# Patient Record
Sex: FEMALE | Race: BLACK OR AFRICAN AMERICAN | NOT HISPANIC OR LATINO | URBAN - METROPOLITAN AREA
[De-identification: names, ages, dates, MRNs, and addresses within clinical notes are randomized per-mention and may not be internally consistent; named-entity substitution may affect disease eponyms.]

---

## 2020-06-18 ENCOUNTER — OFFICE VISIT (OUTPATIENT)
Dept: URBAN - METROPOLITAN AREA TELEHEALTH 2 | Facility: TELEHEALTH | Age: 70
End: 2020-06-18
Payer: MEDICARE

## 2020-06-18 DIAGNOSIS — I85.10 SECONDARY ESOPHAGEAL VARICES WITHOUT BLEEDING: ICD-10-CM

## 2020-06-18 DIAGNOSIS — K74.69 OTHER CIRRHOSIS OF LIVER: ICD-10-CM

## 2020-06-18 DIAGNOSIS — K31.84 GASTROPARESIS: ICD-10-CM

## 2020-06-18 DIAGNOSIS — K21.9 GERD (GASTROESOPHAGEAL REFLUX DISEASE): ICD-10-CM

## 2020-06-18 PROCEDURE — 3017F COLORECTAL CA SCREEN DOC REV: CPT | Performed by: NURSE PRACTITIONER

## 2020-06-18 PROCEDURE — 99213 OFFICE O/P EST LOW 20 MIN: CPT | Performed by: NURSE PRACTITIONER

## 2020-06-18 PROCEDURE — G8427 DOCREV CUR MEDS BY ELIG CLIN: HCPCS | Performed by: NURSE PRACTITIONER

## 2020-06-18 RX ORDER — ASPIRIN 81 MG/1
TAKE 1 TABLET (81 MG) BY ORAL ROUTE ONCE DAILY TABLET, COATED ORAL 1
Qty: 0 | Refills: 0 | Status: ACTIVE | COMMUNITY
Start: 1900-01-01

## 2020-06-18 RX ORDER — SPIRONOLACTONE 25 MG/1
TABLET ORAL
Qty: 0 | Refills: 0 | Status: ACTIVE | COMMUNITY
Start: 1900-01-01

## 2020-06-18 RX ORDER — PANTOPRAZOLE SODIUM 40 MG/1
TAKE 1 TABLET BY MOUTH TWICE DAILY TABLET, DELAYED RELEASE ORAL
Qty: 180 | Refills: 3 | Status: ACTIVE | COMMUNITY
Start: 2020-04-28

## 2020-06-18 RX ORDER — METFORMIN HYDROCHLORIDE 500 MG/1
TABLET, COATED ORAL
Qty: 0 | Refills: 0 | Status: ACTIVE | COMMUNITY
Start: 1900-01-01

## 2020-06-18 RX ORDER — NADOLOL 20 MG/1
TAKE 0.5 TABLET BY ORAL ROUTE DAILY TABLET ORAL
Qty: 15 | Refills: 11 | Status: ACTIVE | COMMUNITY
Start: 2019-11-04

## 2020-06-18 RX ORDER — AMLODIPINE BESYLATE 5 MG
TABLET ORAL
Qty: 0 | Refills: 0 | Status: ACTIVE | COMMUNITY
Start: 1900-01-01

## 2020-06-18 RX ORDER — LORATADINE/PSEUDOEPHEDRINE 5 MG-120MG
TABLET, EXTENDED RELEASE 12 HR ORAL
Qty: 0 | Refills: 0 | Status: ACTIVE | COMMUNITY
Start: 1900-01-01

## 2020-06-18 RX ORDER — ALENDRONATE SODIUM 70 MG
TABLET ORAL
Qty: 0 | Refills: 0 | Status: ACTIVE | COMMUNITY
Start: 1900-01-01

## 2020-06-18 RX ORDER — SUCRALFATE 1 G/1
TAKE 1 TABLET BY MOUTH FOUR TIMES DAILY ON EMPTY STOMACH 1 HOUR BEFORE MEALS AND AT BEDTIME TABLET ORAL
Qty: 360 | Refills: 3 | Status: ACTIVE | COMMUNITY
Start: 2020-01-30

## 2020-06-18 NOTE — HPI-TODAY'S VISIT:
6/18/2020 Rocco presents for follow up of cirrhosis secondary to NAFLD with sequela of esophageal varices, low MELD, reflux, gastroparesis, and IBS. She is doing well during the quarentine, she has been more active and continues to work on weight loss.    3/6/2020 Rocco presents for evaluation of RUQ abdominal pain. This began this week, not exacerbated by eating or having a BM. Worse with laying on her right side. She is not taking anything for the pain. She feels like someone is playing a piano in her abdomen. She denies fever or chills. She is eating her low sodium diet, has no swelling or other associated symptoms. MB  12/17/2019 Rocco presents for follow up of Fatty liver cirrhosis with sequela of esophageal varices, low MELD, reflux, gastroparesis and IBS. Since her last visit she did not start the nadolol, her cardiologist increaed her metoprolol and her PCP added spironolactone for HTN and swelling. Earlier this year she is s/p EGD with new onset esophageal varices. She has a history of fatty liver with fairly normal synthetic function. Her Repeat US show significant steatosis and splenomegaly. She has no sequela of HE or Ascites. She is feeling better on protonix 40mg BID. She has had a chronic work up with positive SMAB and elevated ferritin with negative IGG and normal HFE work up. Her bowels are fairly regular on low dose fiber. SHe met with jennifer and working on weight loss and low sodium diet. Her last MELD is 7. MB

## 2020-08-27 ENCOUNTER — OFFICE VISIT (OUTPATIENT)
Dept: URBAN - METROPOLITAN AREA MEDICAL CENTER 1 | Facility: MEDICAL CENTER | Age: 70
End: 2020-08-27
Payer: MEDICARE

## 2020-08-27 DIAGNOSIS — K29.30 CHRONIC SUPERFICIAL GASTRITIS: ICD-10-CM

## 2020-08-27 DIAGNOSIS — K31.89 ACQUIRED DEFORMITY OF DUODENUM: ICD-10-CM

## 2020-08-27 PROCEDURE — 43239 EGD BIOPSY SINGLE/MULTIPLE: CPT | Performed by: INTERNAL MEDICINE

## 2020-11-09 ENCOUNTER — OFFICE VISIT (OUTPATIENT)
Dept: URBAN - NONMETROPOLITAN AREA CLINIC 2 | Facility: CLINIC | Age: 70
End: 2020-11-09
Payer: MEDICARE

## 2020-11-09 ENCOUNTER — LAB OUTSIDE AN ENCOUNTER (OUTPATIENT)
Dept: URBAN - NONMETROPOLITAN AREA CLINIC 2 | Facility: CLINIC | Age: 70
End: 2020-11-09

## 2020-11-09 VITALS
BODY MASS INDEX: 34.4 KG/M2 | DIASTOLIC BLOOD PRESSURE: 64 MMHG | WEIGHT: 227 LBS | SYSTOLIC BLOOD PRESSURE: 104 MMHG | HEIGHT: 68 IN | TEMPERATURE: 96.2 F | HEART RATE: 91 BPM

## 2020-11-09 DIAGNOSIS — K44.9 HIATAL HERNIA: ICD-10-CM

## 2020-11-09 DIAGNOSIS — K21.9 GERD (GASTROESOPHAGEAL REFLUX DISEASE): ICD-10-CM

## 2020-11-09 DIAGNOSIS — K74.60 CIRRHOSIS: ICD-10-CM

## 2020-11-09 DIAGNOSIS — Z86.010 PERSONAL HISTORY OF COLONIC POLYPS: ICD-10-CM

## 2020-11-09 DIAGNOSIS — K58.9 IRRITABLE BOWEL SYNDROME: ICD-10-CM

## 2020-11-09 DIAGNOSIS — K31.84 GASTROPARESIS: ICD-10-CM

## 2020-11-09 DIAGNOSIS — I85.00 ESOPHAGEAL VARICES: ICD-10-CM

## 2020-11-09 DIAGNOSIS — K76.0 FATTY LIVER: ICD-10-CM

## 2020-11-09 PROCEDURE — 99214 OFFICE O/P EST MOD 30 MIN: CPT | Performed by: NURSE PRACTITIONER

## 2020-11-09 PROCEDURE — 3017F COLORECTAL CA SCREEN DOC REV: CPT | Performed by: NURSE PRACTITIONER

## 2020-11-09 PROCEDURE — G8427 DOCREV CUR MEDS BY ELIG CLIN: HCPCS | Performed by: NURSE PRACTITIONER

## 2020-11-09 PROCEDURE — 1036F TOBACCO NON-USER: CPT | Performed by: NURSE PRACTITIONER

## 2020-11-09 RX ORDER — AMLODIPINE BESYLATE 5 MG
TABLET ORAL
Qty: 0 | Refills: 0 | Status: ACTIVE | COMMUNITY
Start: 1900-01-01

## 2020-11-09 RX ORDER — SUCRALFATE 1 G/1
TAKE 1 TABLET BY MOUTH FOUR TIMES DAILY ON EMPTY STOMACH 1 HOUR BEFORE MEALS AND AT BEDTIME TABLET ORAL
Qty: 360 | Refills: 3 | Status: ACTIVE | COMMUNITY
Start: 2020-01-30

## 2020-11-09 RX ORDER — SPIRONOLACTONE 25 MG/1
TABLET ORAL
Qty: 0 | Refills: 0 | Status: ACTIVE | COMMUNITY
Start: 1900-01-01

## 2020-11-09 RX ORDER — NADOLOL 20 MG/1
TAKE 0.5 TABLET BY ORAL ROUTE DAILY TABLET ORAL
Qty: 15 | Refills: 11 | Status: ACTIVE | COMMUNITY
Start: 2019-11-04

## 2020-11-09 RX ORDER — ALENDRONATE SODIUM 70 MG
TABLET ORAL
Qty: 0 | Refills: 0 | Status: ACTIVE | COMMUNITY
Start: 1900-01-01

## 2020-11-09 RX ORDER — LORATADINE/PSEUDOEPHEDRINE 5 MG-120MG
TABLET, EXTENDED RELEASE 12 HR ORAL
Qty: 0 | Refills: 0 | Status: ACTIVE | COMMUNITY
Start: 1900-01-01

## 2020-11-09 RX ORDER — METFORMIN HYDROCHLORIDE 500 MG/1
TABLET, COATED ORAL
Qty: 0 | Refills: 0 | Status: ACTIVE | COMMUNITY
Start: 1900-01-01

## 2020-11-09 RX ORDER — PANTOPRAZOLE SODIUM 40 MG/1
TAKE 1 TABLET BY MOUTH TWICE DAILY TABLET, DELAYED RELEASE ORAL
Qty: 180 | Refills: 3 | Status: ACTIVE | COMMUNITY
Start: 2020-04-28

## 2020-11-09 RX ORDER — ASPIRIN 81 MG/1
TAKE 1 TABLET (81 MG) BY ORAL ROUTE ONCE DAILY TABLET, COATED ORAL 1
Qty: 0 | Refills: 0 | Status: ACTIVE | COMMUNITY
Start: 1900-01-01

## 2020-11-09 NOTE — HPI-TODAY'S VISIT:
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 Since her last visit she status post EGD for [facility] screening. She has no recurrence on her repeat endoscopy in August. She is stable on nadolol 10 mg twice daily. She denies any abdominal distention or swelling, she has gained weight during the pandemic with the quarantine and agrees that this is due to poor nutritional choices. We continue to discuss low-sodium diet which she tries to maintain for her liver and heart health. Her reflux is stable on high-dose PPI twice daily. She agrees with some weight loss we will likely be able to wean this. She is due for repeat lab work and imaging for HCC screening. Her last ultrasound was in April with no lesions. Her meld this spring was 7. Today she has no symptoms of encephalopathy, jaundice, or pruritus. She is doing well otherwise with no new GI complaints. MB

## 2020-11-10 LAB
A/G RATIO: 1.6
AFP, SERUM, TUMOR MARKER: 4.4
ALBUMIN: 4.2
ALKALINE PHOSPHATASE: 96
ALT (SGPT): 20
AST (SGOT): 28
BASO (ABSOLUTE): 0
BASOS: 1
BILIRUBIN, TOTAL: 0.2
BUN/CREATININE RATIO: 17
BUN: 20
CALCIUM: 9.4
CARBON DIOXIDE, TOTAL: 23
CHLORIDE: 102
CREATININE: 1.16
EGFR IF AFRICN AM: 55
EGFR IF NONAFRICN AM: 48
EOS (ABSOLUTE): 0.1
EOS: 2
GLOBULIN, TOTAL: 2.7
GLUCOSE: 131
HEMATOCRIT: 36.9
HEMATOLOGY COMMENTS:: (no result)
HEMOGLOBIN: 11.3
IMMATURE CELLS: (no result)
IMMATURE GRANS (ABS): 0
IMMATURE GRANULOCYTES: 0
INR: 1
LYMPHS (ABSOLUTE): 1.9
LYMPHS: 39
MCH: 25.1
MCHC: 30.6
MCV: 82
MONOCYTES(ABSOLUTE): 0.4
MONOCYTES: 8
NEUTROPHILS (ABSOLUTE): 2.5
NEUTROPHILS: 50
NRBC: (no result)
PLATELETS: 244
POTASSIUM: 5
PROTEIN, TOTAL: 6.9
PROTHROMBIN TIME: 10.6
RBC: 4.5
RDW: 15.2
SODIUM: 141
WBC: 4.9

## 2021-02-24 ENCOUNTER — ERX REFILL RESPONSE (OUTPATIENT)
Dept: URBAN - NONMETROPOLITAN AREA CLINIC 2 | Facility: CLINIC | Age: 71
End: 2021-02-24

## 2021-02-24 ENCOUNTER — TELEPHONE ENCOUNTER (OUTPATIENT)
Dept: URBAN - NONMETROPOLITAN AREA CLINIC 2 | Facility: CLINIC | Age: 71
End: 2021-02-24

## 2021-02-24 RX ORDER — SUCRALFATE 1 G/1
TAKE 1 TABLET BY MOUTH FOUR TIMES DAILY ON EMPTY STOMACH 1 HOUR BEFORE MEALS AND AT BEDTIME TABLET ORAL
Qty: 360 | Refills: 3

## 2021-02-24 RX ORDER — SUCRALFATE 1 G/1
TAKE 1 TABLET BY MOUTH FOUR TIMES DAILY ON EMPTY STOMACH 1 HOUR BEFORE MEALS AND AT BEDTIME TABLET ORAL TWICE A DAY
Qty: 180 TABLET | Refills: 3
End: 2022-02-19

## 2021-03-17 NOTE — PHYSICAL EXAM GASTROINTESTINAL
Abdomen Self Exam, soft, nontender, nondistended , no masses palpable follow up PRN if any concerns

## 2021-05-05 ENCOUNTER — ERX REFILL RESPONSE (OUTPATIENT)
Dept: URBAN - NONMETROPOLITAN AREA CLINIC 2 | Facility: CLINIC | Age: 71
End: 2021-05-05

## 2021-05-05 RX ORDER — PANTOPRAZOLE SODIUM 40 MG/1
TAKE 1 TABLET BY MOUTH TWICE DAILY TABLET, DELAYED RELEASE ORAL
Qty: 180 | Refills: 3

## 2021-05-10 ENCOUNTER — LAB OUTSIDE AN ENCOUNTER (OUTPATIENT)
Dept: URBAN - NONMETROPOLITAN AREA CLINIC 2 | Facility: CLINIC | Age: 71
End: 2021-05-10

## 2021-05-10 ENCOUNTER — OFFICE VISIT (OUTPATIENT)
Dept: URBAN - NONMETROPOLITAN AREA CLINIC 2 | Facility: CLINIC | Age: 71
End: 2021-05-10
Payer: MEDICARE

## 2021-05-10 VITALS
DIASTOLIC BLOOD PRESSURE: 83 MMHG | SYSTOLIC BLOOD PRESSURE: 137 MMHG | HEIGHT: 68 IN | WEIGHT: 217 LBS | BODY MASS INDEX: 32.89 KG/M2 | HEART RATE: 89 BPM | TEMPERATURE: 96.2 F

## 2021-05-10 DIAGNOSIS — I85.00 ESOPHAGEAL VARICES: ICD-10-CM

## 2021-05-10 DIAGNOSIS — K31.84 GASTROPARESIS: ICD-10-CM

## 2021-05-10 DIAGNOSIS — K74.60 CIRRHOSIS: ICD-10-CM

## 2021-05-10 DIAGNOSIS — K44.9 HIATAL HERNIA: ICD-10-CM

## 2021-05-10 DIAGNOSIS — K21.9 GERD (GASTROESOPHAGEAL REFLUX DISEASE): ICD-10-CM

## 2021-05-10 DIAGNOSIS — Z86.010 PERSONAL HISTORY OF COLONIC POLYPS: ICD-10-CM

## 2021-05-10 DIAGNOSIS — K76.0 FATTY LIVER: ICD-10-CM

## 2021-05-10 DIAGNOSIS — K58.9 IRRITABLE BOWEL SYNDROME: ICD-10-CM

## 2021-05-10 PROCEDURE — 99214 OFFICE O/P EST MOD 30 MIN: CPT | Performed by: INTERNAL MEDICINE

## 2021-05-10 RX ORDER — NADOLOL 20 MG/1
TAKE 0.5 TABLET BY ORAL ROUTE DAILY TABLET ORAL
Qty: 15 | Refills: 11 | Status: ACTIVE | COMMUNITY
Start: 2019-11-04

## 2021-05-10 RX ORDER — ALENDRONATE SODIUM 70 MG
TABLET ORAL
Qty: 0 | Refills: 0 | Status: ACTIVE | COMMUNITY
Start: 1900-01-01

## 2021-05-10 RX ORDER — ASPIRIN 81 MG/1
TAKE 1 TABLET (81 MG) BY ORAL ROUTE ONCE DAILY TABLET, COATED ORAL 1
Qty: 0 | Refills: 0 | Status: ACTIVE | COMMUNITY
Start: 1900-01-01

## 2021-05-10 RX ORDER — AMLODIPINE BESYLATE 5 MG
TABLET ORAL
Qty: 0 | Refills: 0 | Status: ACTIVE | COMMUNITY
Start: 1900-01-01

## 2021-05-10 RX ORDER — METFORMIN HYDROCHLORIDE 500 MG/1
TABLET, COATED ORAL
Qty: 0 | Refills: 0 | Status: ACTIVE | COMMUNITY
Start: 1900-01-01

## 2021-05-10 RX ORDER — SUCRALFATE 1 G/1
TAKE 1 TABLET BY MOUTH FOUR TIMES DAILY ON EMPTY STOMACH 1 HOUR BEFORE MEALS AND AT BEDTIME TABLET ORAL TWICE A DAY
Qty: 180 TABLET | Refills: 3 | Status: ACTIVE | COMMUNITY
End: 2022-02-19

## 2021-05-10 RX ORDER — LORATADINE/PSEUDOEPHEDRINE 5 MG-120MG
TABLET, EXTENDED RELEASE 12 HR ORAL
Qty: 0 | Refills: 0 | Status: ACTIVE | COMMUNITY
Start: 1900-01-01

## 2021-05-10 RX ORDER — PANTOPRAZOLE SODIUM 40 MG/1
TAKE 1 TABLET BY MOUTH TWICE DAILY TABLET, DELAYED RELEASE ORAL
Qty: 180 | Refills: 3 | Status: ACTIVE | COMMUNITY

## 2021-05-10 RX ORDER — SPIRONOLACTONE 25 MG/1
TABLET ORAL
Qty: 0 | Refills: 0 | Status: ACTIVE | COMMUNITY
Start: 1900-01-01

## 2021-05-10 NOTE — HPI-TODAY'S VISIT:
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 Since her last visit she status post EGD for [facility] screening. She has no recurrence on her repeat endoscopy in August. She is stable on nadolol 10 mg twice daily. She denies any abdominal distention or swelling, she has gained weight during the pandemic with the quarantine and agrees that this is due to poor nutritional choices. We continue to discuss low-sodium diet which she tries to maintain for her liver and heart health. Her reflux is stable on high-dose PPI twice daily. She agrees with some weight loss we will likely be able to wean this. She is due for repeat lab work and imaging for HCC screening. Her last ultrasound was in April with no lesions. Her meld this spring was 7. Today she has no symptoms of encephalopathy, jaundice, or pruritus. She is doing well otherwise with no new GI complaints. 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  Since her last visit her labs and ultrasound are stable.  She is down 9 pounds and continues to work on healthy weight loss.  She denies any breakthrough reflux dysphagia or other associated GI symptoms.  She is due for repeat labs and ultrasound this morning.  She is due for repeat Leyla screening this fall.  Today she is doing well otherwise with no new GI complaints and agree to repeat labs and ultrasound.  MB

## 2021-05-11 LAB
A/G RATIO: 1.4
AFP, SERUM, TUMOR MARKER: 4.5
ALBUMIN: 4.2
ALKALINE PHOSPHATASE: 92
ALT (SGPT): 15
AST (SGOT): 26
BASO (ABSOLUTE): 0
BASOS: 1
BILIRUBIN, TOTAL: 0.3
BUN/CREATININE RATIO: 14
BUN: 19
CALCIUM: 10.1
CARBON DIOXIDE, TOTAL: 25
CHLORIDE: 104
CREATININE: 1.37
EGFR IF AFRICN AM: 45
EGFR IF NONAFRICN AM: 39
EOS (ABSOLUTE): 0.1
EOS: 3
GLOBULIN, TOTAL: 3.1
GLUCOSE: 108
HEMATOCRIT: 37.8
HEMATOLOGY COMMENTS:: (no result)
HEMOGLOBIN: 11
IMMATURE CELLS: (no result)
IMMATURE GRANS (ABS): 0
IMMATURE GRANULOCYTES: 0
INR: 1
LYMPHS (ABSOLUTE): 2.1
LYMPHS: 45
MCH: 23.6
MCHC: 29.1
MCV: 81
MONOCYTES(ABSOLUTE): 0.3
MONOCYTES: 6
NEUTROPHILS (ABSOLUTE): 2.1
NEUTROPHILS: 45
NRBC: (no result)
PLATELETS: 260
POTASSIUM: 4.6
PROTEIN, TOTAL: 7.3
PROTHROMBIN TIME: 10.3
RBC: 4.66
RDW: 15.3
SODIUM: 144
WBC: 4.7

## 2021-05-19 ENCOUNTER — TELEPHONE ENCOUNTER (OUTPATIENT)
Dept: URBAN - NONMETROPOLITAN AREA CLINIC 2 | Facility: CLINIC | Age: 71
End: 2021-05-19

## 2021-05-19 ENCOUNTER — OFFICE VISIT (OUTPATIENT)
Dept: URBAN - METROPOLITAN AREA TELEHEALTH 2 | Facility: TELEHEALTH | Age: 71
End: 2021-05-19

## 2021-05-19 RX ORDER — ALENDRONATE SODIUM 70 MG
TABLET ORAL
Qty: 0 | Refills: 0 | Status: ACTIVE | COMMUNITY
Start: 1900-01-01

## 2021-05-19 RX ORDER — PANTOPRAZOLE SODIUM 40 MG/1
TAKE 1 TABLET BY MOUTH TWICE DAILY TABLET, DELAYED RELEASE ORAL
Qty: 180 | Refills: 3 | Status: ACTIVE | COMMUNITY

## 2021-05-19 RX ORDER — DICYCLOMINE HYDROCHLORIDE 10 MG/1
TID AC PRN DIARRHEA/CRAMPING CAPSULE ORAL THREE TIMES A DAY
Qty: 270 CAPSULES | Refills: 3 | OUTPATIENT
Start: 2021-05-19 | End: 2022-05-14

## 2021-05-19 RX ORDER — SPIRONOLACTONE 25 MG/1
TABLET ORAL
Qty: 0 | Refills: 0 | Status: ACTIVE | COMMUNITY
Start: 1900-01-01

## 2021-05-19 RX ORDER — LORATADINE/PSEUDOEPHEDRINE 5 MG-120MG
TABLET, EXTENDED RELEASE 12 HR ORAL
Qty: 0 | Refills: 0 | Status: ACTIVE | COMMUNITY
Start: 1900-01-01

## 2021-05-19 RX ORDER — METFORMIN HYDROCHLORIDE 500 MG/1
TABLET, COATED ORAL
Qty: 0 | Refills: 0 | Status: ACTIVE | COMMUNITY
Start: 1900-01-01

## 2021-05-19 RX ORDER — NADOLOL 20 MG/1
TAKE 0.5 TABLET BY ORAL ROUTE DAILY TABLET ORAL
Qty: 15 | Refills: 11 | Status: ACTIVE | COMMUNITY
Start: 2019-11-04

## 2021-05-19 RX ORDER — SUCRALFATE 1 G/1
TAKE 1 TABLET BY MOUTH FOUR TIMES DAILY ON EMPTY STOMACH 1 HOUR BEFORE MEALS AND AT BEDTIME TABLET ORAL TWICE A DAY
Qty: 180 TABLET | Refills: 3 | Status: ACTIVE | COMMUNITY
End: 2022-02-19

## 2021-05-19 RX ORDER — AMLODIPINE BESYLATE 5 MG
TABLET ORAL
Qty: 0 | Refills: 0 | Status: ACTIVE | COMMUNITY
Start: 1900-01-01

## 2021-05-19 RX ORDER — ASPIRIN 81 MG/1
TAKE 1 TABLET (81 MG) BY ORAL ROUTE ONCE DAILY TABLET, COATED ORAL 1
Qty: 0 | Refills: 0 | Status: ACTIVE | COMMUNITY
Start: 1900-01-01

## 2021-05-20 ENCOUNTER — TELEPHONE ENCOUNTER (OUTPATIENT)
Dept: URBAN - METROPOLITAN AREA CLINIC 92 | Facility: CLINIC | Age: 71
End: 2021-05-20

## 2021-05-20 LAB
A/G RATIO: 1.6
ALBUMIN: 4.5
ALKALINE PHOSPHATASE: 90
ALT (SGPT): 17
AST (SGOT): 28
BASO (ABSOLUTE): 0.1
BASOS: 1
BILIRUBIN, TOTAL: 0.2
BUN/CREATININE RATIO: 15
BUN: 21
C-REACTIVE PROTEIN, QUANT: 8
CALCIUM: 9.6
CARBON DIOXIDE, TOTAL: 22
CHLORIDE: 105
CREATININE: 1.42
EGFR IF AFRICN AM: 43
EGFR IF NONAFRICN AM: 37
EOS (ABSOLUTE): 0.1
EOS: 2
GLOBULIN, TOTAL: 2.9
GLUCOSE: 77
HEMATOCRIT: 36.8
HEMATOLOGY COMMENTS:: (no result)
HEMOGLOBIN: 11.2
IMMATURE CELLS: (no result)
IMMATURE GRANS (ABS): 0
IMMATURE GRANULOCYTES: 1
LYMPHS (ABSOLUTE): 3.2
LYMPHS: 49
MCH: 24.9
MCHC: 30.4
MCV: 82
MONOCYTES(ABSOLUTE): 0.4
MONOCYTES: 7
NEUTROPHILS (ABSOLUTE): 2.6
NEUTROPHILS: 40
NRBC: (no result)
PLATELETS: 252
POTASSIUM: 4.9
PROTEIN, TOTAL: 7.4
RBC: 4.49
RDW: 15.1
SEDIMENTATION RATE-WESTERGREN: 44
SODIUM: 142
WBC: 6.4

## 2021-06-02 ENCOUNTER — TELEPHONE ENCOUNTER (OUTPATIENT)
Dept: URBAN - NONMETROPOLITAN AREA CLINIC 2 | Facility: CLINIC | Age: 71
End: 2021-06-02

## 2021-06-02 RX ORDER — SPIRONOLACTONE 25 MG/1
TABLET ORAL
Qty: 0 | Refills: 0 | Status: ACTIVE | COMMUNITY
Start: 1900-01-01

## 2021-06-02 RX ORDER — ASPIRIN 81 MG/1
TAKE 1 TABLET (81 MG) BY ORAL ROUTE ONCE DAILY TABLET, COATED ORAL 1
Qty: 0 | Refills: 0 | Status: ACTIVE | COMMUNITY
Start: 1900-01-01

## 2021-06-02 RX ORDER — LORATADINE/PSEUDOEPHEDRINE 5 MG-120MG
TABLET, EXTENDED RELEASE 12 HR ORAL
Qty: 0 | Refills: 0 | Status: ACTIVE | COMMUNITY
Start: 1900-01-01

## 2021-06-02 RX ORDER — AMLODIPINE BESYLATE 5 MG
TABLET ORAL
Qty: 0 | Refills: 0 | Status: ACTIVE | COMMUNITY
Start: 1900-01-01

## 2021-06-02 RX ORDER — SUCRALFATE 1 G/1
TAKE 1 TABLET BY MOUTH FOUR TIMES DAILY ON EMPTY STOMACH 1 HOUR BEFORE MEALS AND AT BEDTIME TABLET ORAL TWICE A DAY
Qty: 180 TABLET | Refills: 3 | Status: ACTIVE | COMMUNITY
End: 2022-02-19

## 2021-06-02 RX ORDER — METFORMIN HYDROCHLORIDE 500 MG/1
TABLET, COATED ORAL
Qty: 0 | Refills: 0 | Status: ACTIVE | COMMUNITY
Start: 1900-01-01

## 2021-06-02 RX ORDER — NADOLOL 20 MG/1
TAKE 0.5 TABLET BY ORAL ROUTE DAILY TABLET ORAL
Qty: 15 | Refills: 11 | Status: ACTIVE | COMMUNITY
Start: 2019-11-04

## 2021-06-02 RX ORDER — DICYCLOMINE HYDROCHLORIDE 10 MG/1
TID AC PRN DIARRHEA/CRAMPING CAPSULE ORAL THREE TIMES A DAY
Qty: 270 CAPSULES | Refills: 3 | Status: ACTIVE | COMMUNITY
Start: 2021-05-19 | End: 2022-05-14

## 2021-06-02 RX ORDER — ALENDRONATE SODIUM 70 MG
TABLET ORAL
Qty: 0 | Refills: 0 | Status: ACTIVE | COMMUNITY
Start: 1900-01-01

## 2021-06-02 RX ORDER — PANTOPRAZOLE SODIUM 40 MG/1
TAKE 1 TABLET BY MOUTH TWICE DAILY TABLET, DELAYED RELEASE ORAL
Qty: 180 | Refills: 3 | Status: ACTIVE | COMMUNITY

## 2021-06-02 RX ORDER — HYOSCYAMINE SULFATE 0.12 MG/1
1 TABLET UNDER THE TONGUE AND ALLOW TO DISSOLVE  AS NEEDED TABLET SUBLINGUAL
Qty: 60 TABLETS | Refills: 0 | OUTPATIENT
Start: 2021-06-02 | End: 2021-07-02

## 2021-06-04 ENCOUNTER — TELEPHONE ENCOUNTER (OUTPATIENT)
Dept: URBAN - METROPOLITAN AREA CLINIC 92 | Facility: CLINIC | Age: 71
End: 2021-06-04

## 2021-06-11 ENCOUNTER — ERX REFILL RESPONSE (OUTPATIENT)
Dept: URBAN - NONMETROPOLITAN AREA CLINIC 2 | Facility: CLINIC | Age: 71
End: 2021-06-11

## 2021-06-11 RX ORDER — HYOSCYAMINE SULFATE 0.12 MG/1
1 TABLET UNDER THE TONGUE AND ALLOW TO DISSOLVE  AS NEEDED TABLET SUBLINGUAL
Qty: 60 | Refills: 5

## 2021-08-18 ENCOUNTER — OFFICE VISIT (OUTPATIENT)
Dept: URBAN - NONMETROPOLITAN AREA CLINIC 2 | Facility: CLINIC | Age: 71
End: 2021-08-18

## 2021-10-15 ENCOUNTER — TELEPHONE ENCOUNTER (OUTPATIENT)
Dept: URBAN - NONMETROPOLITAN AREA CLINIC 2 | Facility: CLINIC | Age: 71
End: 2021-10-15

## 2021-10-15 RX ORDER — ALENDRONATE SODIUM 70 MG
TABLET ORAL
Qty: 0 | Refills: 0 | Status: ACTIVE | COMMUNITY
Start: 1900-01-01

## 2021-10-15 RX ORDER — DICYCLOMINE HYDROCHLORIDE 10 MG/1
TID AC PRN DIARRHEA/CRAMPING CAPSULE ORAL THREE TIMES A DAY
Qty: 270 CAPSULES | Refills: 3 | Status: ACTIVE | COMMUNITY
Start: 2021-05-19 | End: 2022-05-14

## 2021-10-15 RX ORDER — ASPIRIN 81 MG/1
TAKE 1 TABLET (81 MG) BY ORAL ROUTE ONCE DAILY TABLET, COATED ORAL 1
Qty: 0 | Refills: 0 | Status: ACTIVE | COMMUNITY
Start: 1900-01-01

## 2021-10-15 RX ORDER — HYOSCYAMINE SULFATE 0.12 MG/1
1 TABLET UNDER THE TONGUE AND ALLOW TO DISSOLVE  AS NEEDED TABLET SUBLINGUAL
Qty: 60 | Refills: 5 | Status: ACTIVE | COMMUNITY

## 2021-10-15 RX ORDER — LORATADINE/PSEUDOEPHEDRINE 5 MG-120MG
TABLET, EXTENDED RELEASE 12 HR ORAL
Qty: 0 | Refills: 0 | Status: ACTIVE | COMMUNITY
Start: 1900-01-01

## 2021-10-15 RX ORDER — NADOLOL 20 MG/1
TAKE 0.5 TABLET BY ORAL ROUTE DAILY TABLET ORAL
Qty: 15 | Refills: 11 | Status: ACTIVE | COMMUNITY
Start: 2019-11-04

## 2021-10-15 RX ORDER — METFORMIN HYDROCHLORIDE 500 MG/1
TABLET, COATED ORAL
Qty: 0 | Refills: 0 | Status: ACTIVE | COMMUNITY
Start: 1900-01-01

## 2021-10-15 RX ORDER — SUCRALFATE 1 G/1
TAKE 1 TABLET BY MOUTH FOUR TIMES DAILY ON EMPTY STOMACH 1 HOUR BEFORE MEALS AND AT BEDTIME TABLET ORAL TWICE A DAY
Qty: 180 TABLET | Refills: 3 | Status: ACTIVE | COMMUNITY
End: 2022-02-19

## 2021-10-15 RX ORDER — SUCRALFATE 1 G/1
1 PO BID BEFORE LUNCH AND BEDTIME NOT W/I 1 HOUR OF OTHER MEDS TABLET ORAL BID
Qty: 180 TABLET | Refills: 3 | OUTPATIENT
Start: 2021-10-15 | End: 2022-10-10

## 2021-10-15 RX ORDER — AMLODIPINE BESYLATE 5 MG
TABLET ORAL
Qty: 0 | Refills: 0 | Status: ACTIVE | COMMUNITY
Start: 1900-01-01

## 2021-10-15 RX ORDER — PANTOPRAZOLE SODIUM 40 MG/1
TAKE 1 TABLET BY MOUTH TWICE DAILY TABLET, DELAYED RELEASE ORAL
Qty: 180 | Refills: 3 | Status: ACTIVE | COMMUNITY

## 2021-10-15 RX ORDER — SPIRONOLACTONE 25 MG/1
TABLET ORAL
Qty: 0 | Refills: 0 | Status: ACTIVE | COMMUNITY
Start: 1900-01-01

## 2021-10-18 ENCOUNTER — LAB OUTSIDE AN ENCOUNTER (OUTPATIENT)
Dept: URBAN - NONMETROPOLITAN AREA CLINIC 2 | Facility: CLINIC | Age: 71
End: 2021-10-18

## 2021-10-19 LAB
A/G RATIO: 1.4
ALBUMIN: 4.3
ALKALINE PHOSPHATASE: 101
ALT (SGPT): 19
AST (SGOT): 25
BASO (ABSOLUTE): 0
BASOS: 1
BILIRUBIN, TOTAL: 0.2
BUN/CREATININE RATIO: 13
BUN: 17
CALCIUM: 8.8
CARBON DIOXIDE, TOTAL: 25
CHLORIDE: 106
CREATININE: 1.28
EGFR IF AFRICN AM: 49
EGFR IF NONAFRICN AM: 42
EOS (ABSOLUTE): 0.1
EOS: 3
GLOBULIN, TOTAL: 3
GLUCOSE: 99
HEMATOCRIT: 36.8
HEMATOLOGY COMMENTS:: (no result)
HEMOGLOBIN: 11.3
IMMATURE CELLS: (no result)
IMMATURE GRANS (ABS): 0
IMMATURE GRANULOCYTES: 0
INR: 0.9
LYMPHS (ABSOLUTE): 2.4
LYMPHS: 47
MCH: 24.2
MCHC: 30.7
MCV: 79
MONOCYTES(ABSOLUTE): 0.3
MONOCYTES: 6
NEUTROPHILS (ABSOLUTE): 2.2
NEUTROPHILS: 43
NRBC: (no result)
PLATELETS: 229
POTASSIUM: 5.2
PROTEIN, TOTAL: 7.3
PROTHROMBIN TIME: 10
RBC: 4.67
RDW: 15.3
SODIUM: 144
WBC: 5.1

## 2021-11-08 ENCOUNTER — LAB OUTSIDE AN ENCOUNTER (OUTPATIENT)
Dept: URBAN - NONMETROPOLITAN AREA CLINIC 2 | Facility: CLINIC | Age: 71
End: 2021-11-08

## 2021-11-08 ENCOUNTER — WEB ENCOUNTER (OUTPATIENT)
Dept: URBAN - NONMETROPOLITAN AREA CLINIC 2 | Facility: CLINIC | Age: 71
End: 2021-11-08

## 2021-11-08 ENCOUNTER — OFFICE VISIT (OUTPATIENT)
Dept: URBAN - NONMETROPOLITAN AREA CLINIC 2 | Facility: CLINIC | Age: 71
End: 2021-11-08
Payer: MEDICARE

## 2021-11-08 VITALS
SYSTOLIC BLOOD PRESSURE: 130 MMHG | WEIGHT: 219.6 LBS | HEART RATE: 83 BPM | HEIGHT: 68 IN | TEMPERATURE: 97.8 F | BODY MASS INDEX: 33.28 KG/M2 | DIASTOLIC BLOOD PRESSURE: 81 MMHG

## 2021-11-08 DIAGNOSIS — K44.9 HIATAL HERNIA: ICD-10-CM

## 2021-11-08 DIAGNOSIS — K31.84 GASTROPARESIS: ICD-10-CM

## 2021-11-08 DIAGNOSIS — Z86.010 PERSONAL HISTORY OF COLONIC POLYPS: ICD-10-CM

## 2021-11-08 DIAGNOSIS — I85.00 ESOPHAGEAL VARICES: ICD-10-CM

## 2021-11-08 DIAGNOSIS — K21.9 GERD (GASTROESOPHAGEAL REFLUX DISEASE): ICD-10-CM

## 2021-11-08 DIAGNOSIS — K74.60 CIRRHOSIS: ICD-10-CM

## 2021-11-08 DIAGNOSIS — K76.0 FATTY LIVER: ICD-10-CM

## 2021-11-08 DIAGNOSIS — R10.11 RUQ ABDOMINAL PAIN: ICD-10-CM

## 2021-11-08 DIAGNOSIS — K76.9 LIVER LESION: ICD-10-CM

## 2021-11-08 DIAGNOSIS — K58.9 IRRITABLE BOWEL SYNDROME: ICD-10-CM

## 2021-11-08 DIAGNOSIS — K57.90 DIVERTICULOSIS: ICD-10-CM

## 2021-11-08 PROCEDURE — 99214 OFFICE O/P EST MOD 30 MIN: CPT | Performed by: NURSE PRACTITIONER

## 2021-11-08 RX ORDER — SUCRALFATE 1 G/1
TAKE 1 TABLET BY MOUTH FOUR TIMES DAILY ON EMPTY STOMACH 1 HOUR BEFORE MEALS AND AT BEDTIME TABLET ORAL TWICE A DAY
Qty: 180 TABLET | Refills: 3 | Status: ACTIVE | COMMUNITY
End: 2022-02-19

## 2021-11-08 RX ORDER — LORATADINE/PSEUDOEPHEDRINE 5 MG-120MG
TABLET, EXTENDED RELEASE 12 HR ORAL
Qty: 0 | Refills: 0 | Status: ACTIVE | COMMUNITY
Start: 1900-01-01

## 2021-11-08 RX ORDER — HYOSCYAMINE SULFATE 0.12 MG/1
1 TABLET UNDER THE TONGUE AND ALLOW TO DISSOLVE  AS NEEDED TABLET SUBLINGUAL
Qty: 60 | Refills: 5 | Status: ACTIVE | COMMUNITY

## 2021-11-08 RX ORDER — PANTOPRAZOLE SODIUM 40 MG/1
TAKE 1 TABLET BY MOUTH TWICE DAILY TABLET, DELAYED RELEASE ORAL
Qty: 180 | Refills: 3 | Status: ACTIVE | COMMUNITY

## 2021-11-08 RX ORDER — ALENDRONATE SODIUM 70 MG
TABLET ORAL
Qty: 0 | Refills: 0 | Status: ACTIVE | COMMUNITY
Start: 1900-01-01

## 2021-11-08 RX ORDER — AMLODIPINE BESYLATE 5 MG
TABLET ORAL
Qty: 0 | Refills: 0 | Status: ACTIVE | COMMUNITY
Start: 1900-01-01

## 2021-11-08 RX ORDER — METFORMIN HYDROCHLORIDE 500 MG/1
TABLET, COATED ORAL
Qty: 0 | Refills: 0 | Status: ACTIVE | COMMUNITY
Start: 1900-01-01

## 2021-11-08 RX ORDER — SUCRALFATE 1 G/1
1 PO BID BEFORE LUNCH AND BEDTIME NOT W/I 1 HOUR OF OTHER MEDS TABLET ORAL BID
Qty: 180 TABLET | Refills: 3 | Status: ACTIVE | COMMUNITY
Start: 2021-10-15 | End: 2022-10-10

## 2021-11-08 RX ORDER — NADOLOL 20 MG/1
TAKE 0.5 TABLET BY ORAL ROUTE DAILY TABLET ORAL
Qty: 15 | Refills: 11 | Status: ACTIVE | COMMUNITY
Start: 2019-11-04

## 2021-11-08 RX ORDER — ASPIRIN 81 MG/1
TAKE 1 TABLET (81 MG) BY ORAL ROUTE ONCE DAILY TABLET, COATED ORAL 1
Qty: 0 | Refills: 0 | Status: ACTIVE | COMMUNITY
Start: 1900-01-01

## 2021-11-08 RX ORDER — SPIRONOLACTONE 25 MG/1
TABLET ORAL
Qty: 0 | Refills: 0 | Status: ACTIVE | COMMUNITY
Start: 1900-01-01

## 2021-11-08 RX ORDER — DICYCLOMINE HYDROCHLORIDE 10 MG/1
TID AC PRN DIARRHEA/CRAMPING CAPSULE ORAL THREE TIMES A DAY
Qty: 270 CAPSULES | Refills: 3 | Status: ACTIVE | COMMUNITY
Start: 2021-05-19 | End: 2022-05-14

## 2021-11-08 NOTE — HPI-TODAY'S VISIT:
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 Since her last visit she status post EGD for [facility] screening. She has no recurrence on her repeat endoscopy in August. She is stable on nadolol 10 mg twice daily. She denies any abdominal distention or swelling, she has gained weight during the pandemic with the quarantine and agrees that this is due to poor nutritional choices. We continue to discuss low-sodium diet which she tries to maintain for her liver and heart health. Her reflux is stable on high-dose PPI twice daily. She agrees with some weight loss we will likely be able to wean this. She is due for repeat lab work and imaging for HCC screening. Her last ultrasound was in April with no lesions. Her meld this spring was 7. Today she has no symptoms of encephalopathy, jaundice, or pruritus. She is doing well otherwise with no new GI complaints. 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  Since her last visit her labs and ultrasound are stable.  She is down 9 pounds and continues to work on healthy weight loss.  She denies any breakthrough reflux dysphagia or other associated GI symptoms.  She is due for repeat labs and ultrasound this morning.  She is due for repeat Leyla screening this fall.  Today she is doing well otherwise with no new GI complaints and agree to repeat labs and ultrasound.  MB   11/8/2021 Sophie presents for follow-up of end-stage liver disease, fatty liver, reflux with a history of gastroparesis, sequela of liver disease including history of esophageal varices on imaging, a liver lesion noted and stable, and new onset right upper quadrant abdominal pain. Last month she messaged us with postprandial right upper quadrant abdominal pain. She had blood work with normal synthetic function, ultrasound imaging prior to that with no obstructive process. She is due for varix screening. She states this is worse after a meal. She does take PPI twice daily. She states the pain feels similar to her gallbladder but more in the middle. Her blood sugars have been labile, her gastroparesis had improved off narcotics but she does feel like things may be flaring. Today the discomfort seems to have improved somewhat. She is working on weight loss. She does agree to repeat EGD for [facility] screening and further evaluation of her discomfort, she does agree to try FD guard as needed. MB

## 2021-12-16 ENCOUNTER — OFFICE VISIT (OUTPATIENT)
Dept: URBAN - METROPOLITAN AREA MEDICAL CENTER 1 | Facility: MEDICAL CENTER | Age: 71
End: 2021-12-16
Payer: MEDICARE

## 2021-12-16 DIAGNOSIS — K29.30 CHRONIC SUPERFICIAL GASTRITIS: ICD-10-CM

## 2021-12-16 DIAGNOSIS — K20.80 ESOPHAGITIS DISSECANS SUPERFICIALIS: ICD-10-CM

## 2021-12-16 PROCEDURE — 43239 EGD BIOPSY SINGLE/MULTIPLE: CPT | Performed by: INTERNAL MEDICINE

## 2022-01-03 ENCOUNTER — TELEPHONE ENCOUNTER (OUTPATIENT)
Dept: URBAN - METROPOLITAN AREA CLINIC 92 | Facility: CLINIC | Age: 72
End: 2022-01-03

## 2022-01-03 RX ORDER — PANTOPRAZOLE SODIUM 40 MG/1
1 TABLET TABLET, DELAYED RELEASE ORAL
Qty: 180 TABLET | Refills: 3

## 2022-02-04 ENCOUNTER — LAB OUTSIDE AN ENCOUNTER (OUTPATIENT)
Dept: URBAN - NONMETROPOLITAN AREA CLINIC 13 | Facility: CLINIC | Age: 72
End: 2022-02-04

## 2022-02-04 ENCOUNTER — WEB ENCOUNTER (OUTPATIENT)
Dept: URBAN - NONMETROPOLITAN AREA CLINIC 13 | Facility: CLINIC | Age: 72
End: 2022-02-04

## 2022-02-04 ENCOUNTER — OFFICE VISIT (OUTPATIENT)
Dept: URBAN - NONMETROPOLITAN AREA CLINIC 13 | Facility: CLINIC | Age: 72
End: 2022-02-04
Payer: MEDICARE

## 2022-02-04 VITALS
HEART RATE: 82 BPM | BODY MASS INDEX: 34.19 KG/M2 | WEIGHT: 225.6 LBS | HEIGHT: 68 IN | SYSTOLIC BLOOD PRESSURE: 128 MMHG | DIASTOLIC BLOOD PRESSURE: 80 MMHG

## 2022-02-04 DIAGNOSIS — Z86.010 PERSONAL HISTORY OF COLONIC POLYPS: ICD-10-CM

## 2022-02-04 DIAGNOSIS — K21.9 GERD (GASTROESOPHAGEAL REFLUX DISEASE): ICD-10-CM

## 2022-02-04 DIAGNOSIS — K57.90 DIVERTICULOSIS: ICD-10-CM

## 2022-02-04 DIAGNOSIS — K76.9 LIVER LESION: ICD-10-CM

## 2022-02-04 DIAGNOSIS — K44.9 HIATAL HERNIA: ICD-10-CM

## 2022-02-04 DIAGNOSIS — K76.0 FATTY LIVER: ICD-10-CM

## 2022-02-04 DIAGNOSIS — K58.9 IRRITABLE BOWEL SYNDROME: ICD-10-CM

## 2022-02-04 DIAGNOSIS — R10.11 RUQ ABDOMINAL PAIN: ICD-10-CM

## 2022-02-04 DIAGNOSIS — I85.00 ESOPHAGEAL VARICES: ICD-10-CM

## 2022-02-04 DIAGNOSIS — K74.60 CIRRHOSIS: ICD-10-CM

## 2022-02-04 DIAGNOSIS — K31.84 GASTROPARESIS: ICD-10-CM

## 2022-02-04 PROCEDURE — 99214 OFFICE O/P EST MOD 30 MIN: CPT | Performed by: NURSE PRACTITIONER

## 2022-02-04 RX ORDER — ASPIRIN 81 MG/1
TAKE 1 TABLET (81 MG) BY ORAL ROUTE ONCE DAILY TABLET, COATED ORAL 1
Qty: 0 | Refills: 0 | Status: ACTIVE | COMMUNITY
Start: 1900-01-01

## 2022-02-04 RX ORDER — AMLODIPINE BESYLATE 5 MG
TABLET ORAL
Qty: 0 | Refills: 0 | Status: ACTIVE | COMMUNITY
Start: 1900-01-01

## 2022-02-04 RX ORDER — SUCRALFATE 1 G/1
TAKE 1 TABLET BY MOUTH FOUR TIMES DAILY ON EMPTY STOMACH 1 HOUR BEFORE MEALS AND AT BEDTIME TABLET ORAL TWICE A DAY
Qty: 180 TABLET | Refills: 3 | Status: ACTIVE | COMMUNITY
End: 2022-02-19

## 2022-02-04 RX ORDER — SPIRONOLACTONE 25 MG/1
TABLET ORAL
Qty: 0 | Refills: 0 | Status: ACTIVE | COMMUNITY
Start: 1900-01-01

## 2022-02-04 RX ORDER — LORATADINE/PSEUDOEPHEDRINE 5 MG-120MG
TABLET, EXTENDED RELEASE 12 HR ORAL
Qty: 0 | Refills: 0 | Status: ACTIVE | COMMUNITY
Start: 1900-01-01

## 2022-02-04 RX ORDER — METFORMIN HYDROCHLORIDE 500 MG/1
TABLET, COATED ORAL
Qty: 0 | Refills: 0 | Status: ACTIVE | COMMUNITY
Start: 1900-01-01

## 2022-02-04 RX ORDER — SUCRALFATE 1 G/1
1 PO BID BEFORE LUNCH AND BEDTIME NOT W/I 1 HOUR OF OTHER MEDS TABLET ORAL BID
Qty: 180 TABLET | Refills: 3 | Status: ACTIVE | COMMUNITY
Start: 2021-10-15 | End: 2022-10-10

## 2022-02-04 RX ORDER — PANTOPRAZOLE SODIUM 40 MG/1
1 TABLET TABLET, DELAYED RELEASE ORAL
Qty: 180 TABLET | Refills: 3 | Status: ACTIVE | COMMUNITY

## 2022-02-04 RX ORDER — HYOSCYAMINE SULFATE 0.12 MG/1
1 TABLET UNDER THE TONGUE AND ALLOW TO DISSOLVE  AS NEEDED TABLET SUBLINGUAL
Qty: 60 | Refills: 5 | Status: ACTIVE | COMMUNITY

## 2022-02-04 RX ORDER — ALENDRONATE SODIUM 70 MG
TABLET ORAL
Qty: 0 | Refills: 0 | Status: ACTIVE | COMMUNITY
Start: 1900-01-01

## 2022-02-04 RX ORDER — NADOLOL 20 MG/1
TAKE 0.5 TABLET BY ORAL ROUTE DAILY TABLET ORAL
Qty: 15 | Refills: 11 | Status: ACTIVE | COMMUNITY
Start: 2019-11-04

## 2022-02-04 RX ORDER — DICYCLOMINE HYDROCHLORIDE 10 MG/1
TID AC PRN DIARRHEA/CRAMPING CAPSULE ORAL THREE TIMES A DAY
Qty: 270 CAPSULES | Refills: 3 | Status: ACTIVE | COMMUNITY
Start: 2021-05-19 | End: 2022-05-14

## 2022-02-04 NOTE — HPI-TODAY'S VISIT:
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 Since her last visit she status post EGD for [facility] screening. She has no recurrence on her repeat endoscopy in August. She is stable on nadolol 10 mg twice daily. She denies any abdominal distention or swelling, she has gained weight during the pandemic with the quarantine and agrees that this is due to poor nutritional choices. We continue to discuss low-sodium diet which she tries to maintain for her liver and heart health. Her reflux is stable on high-dose PPI twice daily. She agrees with some weight loss we will likely be able to wean this. She is due for repeat lab work and imaging for HCC screening. Her last ultrasound was in April with no lesions. Her meld this spring was 7. Today she has no symptoms of encephalopathy, jaundice, or pruritus. She is doing well otherwise with no new GI complaints. 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  Since her last visit her labs and ultrasound are stable.  She is down 9 pounds and continues to work on healthy weight loss.  She denies any breakthrough reflux dysphagia or other associated GI symptoms.  She is due for repeat labs and ultrasound this morning.  She is due for repeat Leyla screening this fall.  Today she is doing well otherwise with no new GI complaints and agree to repeat labs and ultrasound.  MB   11/8/2021 Sophie presents for follow-up of end-stage liver disease, fatty liver, reflux with a history of gastroparesis, sequela of liver disease including history of esophageal varices on imaging, a liver lesion noted and stable, and new onset right upper quadrant abdominal pain. Last month she messaged us with postprandial right upper quadrant abdominal pain. She had blood work with normal synthetic function, ultrasound imaging prior to that with no obstructive process. She is due for varix screening. She states this is worse after a meal. She does take PPI twice daily. She states the pain feels similar to her gallbladder but more in the middle. Her blood sugars have been labile, her gastroparesis had improved off narcotics but she does feel like things may be flaring. Today the discomfort seems to have improved somewhat. She is working on weight loss. She does agree to repeat EGD for [facility] screening and further evaluation of her discomfort, she does agree to try FD guard as needed. MB 2/4/2022 Sophie presents for endoscopy follow-up.  Her repeat EGD reveals no varices and a small amount of retained food.  Her right upper quadrant pain has improved on FD guard as needed.  Her reflux is stable on twice daily PPI.  She continues to have a low meld, she is due for repeat labs and ultrasound in May 2022, her labs and ultrasound are stable in November 2021, she does have a calcified granuloma in the left lobe.  She is struggling with arthritis and has been on prednisone so her blood sugars and weight have increased.  She continues to work on small meals.  Overall today she is doing well with no new GI complaints.  She is due for repeat colonoscopy in May 2022.  MB

## 2022-05-04 ENCOUNTER — LAB OUTSIDE AN ENCOUNTER (OUTPATIENT)
Dept: URBAN - NONMETROPOLITAN AREA CLINIC 13 | Facility: CLINIC | Age: 72
End: 2022-05-04

## 2022-05-05 ENCOUNTER — OFFICE VISIT (OUTPATIENT)
Dept: URBAN - METROPOLITAN AREA MEDICAL CENTER 1 | Facility: MEDICAL CENTER | Age: 72
End: 2022-05-05
Payer: MEDICARE

## 2022-05-05 DIAGNOSIS — D12.2 ADENOMA OF ASCENDING COLON: ICD-10-CM

## 2022-05-05 DIAGNOSIS — Z86.010 ADENOMAS PERSONAL HISTORY OF COLONIC POLYPS: ICD-10-CM

## 2022-05-05 PROCEDURE — 45385 COLONOSCOPY W/LESION REMOVAL: CPT | Performed by: INTERNAL MEDICINE

## 2022-06-06 ENCOUNTER — LAB OUTSIDE AN ENCOUNTER (OUTPATIENT)
Dept: URBAN - NONMETROPOLITAN AREA CLINIC 2 | Facility: CLINIC | Age: 72
End: 2022-06-06

## 2022-06-06 ENCOUNTER — OFFICE VISIT (OUTPATIENT)
Dept: URBAN - NONMETROPOLITAN AREA CLINIC 2 | Facility: CLINIC | Age: 72
End: 2022-06-06
Payer: MEDICARE

## 2022-06-06 VITALS
TEMPERATURE: 97.9 F | WEIGHT: 219 LBS | SYSTOLIC BLOOD PRESSURE: 114 MMHG | BODY MASS INDEX: 33.19 KG/M2 | HEART RATE: 83 BPM | DIASTOLIC BLOOD PRESSURE: 80 MMHG | HEIGHT: 68 IN

## 2022-06-06 DIAGNOSIS — K31.84 GASTROPARESIS: ICD-10-CM

## 2022-06-06 DIAGNOSIS — K58.9 IRRITABLE BOWEL SYNDROME: ICD-10-CM

## 2022-06-06 DIAGNOSIS — K57.90 DIVERTICULOSIS: ICD-10-CM

## 2022-06-06 DIAGNOSIS — Z86.010 PERSONAL HISTORY OF COLONIC POLYPS: ICD-10-CM

## 2022-06-06 DIAGNOSIS — K76.0 FATTY LIVER: ICD-10-CM

## 2022-06-06 DIAGNOSIS — K74.69 CIRRHOSIS, CRYPTOGENIC: ICD-10-CM

## 2022-06-06 DIAGNOSIS — I85.00 ESOPHAGEAL VARICES: ICD-10-CM

## 2022-06-06 DIAGNOSIS — K76.9 LIVER LESION: ICD-10-CM

## 2022-06-06 DIAGNOSIS — K44.9 HIATAL HERNIA: ICD-10-CM

## 2022-06-06 DIAGNOSIS — K21.9 GERD (GASTROESOPHAGEAL REFLUX DISEASE): ICD-10-CM

## 2022-06-06 PROCEDURE — 99214 OFFICE O/P EST MOD 30 MIN: CPT | Performed by: NURSE PRACTITIONER

## 2022-06-06 RX ORDER — HYOSCYAMINE SULFATE 0.12 MG/1
1 TABLET UNDER THE TONGUE AND ALLOW TO DISSOLVE  AS NEEDED TABLET SUBLINGUAL
Qty: 60 | Refills: 5 | Status: ACTIVE | COMMUNITY

## 2022-06-06 RX ORDER — METFORMIN HYDROCHLORIDE 500 MG/1
TABLET, COATED ORAL
Qty: 0 | Refills: 0 | Status: ACTIVE | COMMUNITY
Start: 1900-01-01

## 2022-06-06 RX ORDER — LORATADINE/PSEUDOEPHEDRINE 5 MG-120MG
TABLET, EXTENDED RELEASE 12 HR ORAL
Qty: 0 | Refills: 0 | Status: ACTIVE | COMMUNITY
Start: 1900-01-01

## 2022-06-06 RX ORDER — PANTOPRAZOLE SODIUM 40 MG/1
1 TABLET TABLET, DELAYED RELEASE ORAL
Qty: 180 TABLET | Refills: 3 | Status: ACTIVE | COMMUNITY

## 2022-06-06 RX ORDER — ASPIRIN 81 MG/1
TAKE 1 TABLET (81 MG) BY ORAL ROUTE ONCE DAILY TABLET, COATED ORAL 1
Qty: 0 | Refills: 0 | Status: ACTIVE | COMMUNITY
Start: 1900-01-01

## 2022-06-06 RX ORDER — NADOLOL 20 MG/1
TAKE 0.5 TABLET BY ORAL ROUTE DAILY TABLET ORAL
Qty: 15 | Refills: 11 | Status: ACTIVE | COMMUNITY
Start: 2019-11-04

## 2022-06-06 RX ORDER — SUCRALFATE 1 G/1
1 PO BID BEFORE LUNCH AND BEDTIME NOT W/I 1 HOUR OF OTHER MEDS TABLET ORAL BID
Qty: 180 TABLET | Refills: 3 | Status: ACTIVE | COMMUNITY
Start: 2021-10-15 | End: 2022-10-10

## 2022-06-06 RX ORDER — ALENDRONATE SODIUM 70 MG
TABLET ORAL
Qty: 0 | Refills: 0 | Status: ACTIVE | COMMUNITY
Start: 1900-01-01

## 2022-06-06 RX ORDER — SPIRONOLACTONE 25 MG/1
TABLET ORAL
Qty: 0 | Refills: 0 | Status: ACTIVE | COMMUNITY
Start: 1900-01-01

## 2022-06-06 RX ORDER — AMLODIPINE BESYLATE 5 MG
TABLET ORAL
Qty: 0 | Refills: 0 | Status: ACTIVE | COMMUNITY
Start: 1900-01-01

## 2022-06-06 NOTE — HPI-TODAY'S VISIT:
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 Since her last visit she status post EGD for [facility] screening. She has no recurrence on her repeat endoscopy in August. She is stable on nadolol 10 mg twice daily. She denies any abdominal distention or swelling, she has gained weight during the pandemic with the quarantine and agrees that this is due to poor nutritional choices. We continue to discuss low-sodium diet which she tries to maintain for her liver and heart health. Her reflux is stable on high-dose PPI twice daily. She agrees with some weight loss we will likely be able to wean this. She is due for repeat lab work and imaging for HCC screening. Her last ultrasound was in April with no lesions. Her meld this spring was 7. Today she has no symptoms of encephalopathy, jaundice, or pruritus. She is doing well otherwise with no new GI complaints. 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  Since her last visit her labs and ultrasound are stable.  She is down 9 pounds and continues to work on healthy weight loss.  She denies any breakthrough reflux dysphagia or other associated GI symptoms.  She is due for repeat labs and ultrasound this morning.  She is due for repeat Leyla screening this fall.  Today she is doing well otherwise with no new GI complaints and agree to repeat labs and ultrasound.  MB   11/8/2021 Sophie presents for follow-up of end-stage liver disease, fatty liver, reflux with a history of gastroparesis, sequela of liver disease including history of esophageal varices on imaging, a liver lesion noted and stable, and new onset right upper quadrant abdominal pain. Last month she messaged us with postprandial right upper quadrant abdominal pain. She had blood work with normal synthetic function, ultrasound imaging prior to that with no obstructive process. She is due for varix screening. She states this is worse after a meal. She does take PPI twice daily. She states the pain feels similar to her gallbladder but more in the middle. Her blood sugars have been labile, her gastroparesis had improved off narcotics but she does feel like things may be flaring. Today the discomfort seems to have improved somewhat. She is working on weight loss. She does agree to repeat EGD for [facility] screening and further evaluation of her discomfort, she does agree to try FD guard as needed. MB 2/4/2022 Sophie presents for endoscopy follow-up.  Her repeat EGD reveals no varices and a small amount of retained food.  Her right upper quadrant pain has improved on FD guard as needed.  Her reflux is stable on twice daily PPI.  She continues to have a low meld, she is due for repeat labs and ultrasound in May 2022, her labs and ultrasound are stable in November 2021, she does have a calcified granuloma in the left lobe.  She is struggling with arthritis and has been on prednisone so her blood sugars and weight have increased.  She continues to work on small meals.  Overall today she is doing well with no new GI complaints.  She is due for repeat colonoscopy in May 2022.  MB 6/6/2022 Sophie presents for follow-up of end-stage liver disease secondary to NAFLD D with sequela of portal hypertension and esophageal varices.  Since her last visit her repeat colonoscopy reveals 1 tubular adenoma left-sided diverticular disease.  She was on 3 capsules of Metamucil daily and felt that this caused increased diarrhea.  She has occasional episodes of incontinence and loose urgent bowel movements.  We have discussed restarting a low-dose at 1 capsule at night given her diverticular disease specifically.  Her reflux is stable on pantoprazole twice daily.  She is on nadolol 20 mg daily.  She is due for repeat labs and HCC screening.  Her very screening will be due in December.  Today she is doing well with no new GI complaints.  She continues to work on tight glucose control and weight control.  She has lost approximately 6 pounds since her last visit.  Today she is doing well otherwise with no new GI complaints.  MB

## 2022-06-07 ENCOUNTER — TELEPHONE ENCOUNTER (OUTPATIENT)
Dept: URBAN - METROPOLITAN AREA CLINIC 92 | Facility: CLINIC | Age: 72
End: 2022-06-07

## 2022-06-07 LAB
A/G RATIO: 1.5
AFP, SERUM, TUMOR MARKER: 3.8
ALBUMIN: 4.4
ALKALINE PHOSPHATASE: 78
ALT (SGPT): 11
AST (SGOT): 22
BASO (ABSOLUTE): 0
BASOS: 1
BILIRUBIN, TOTAL: 0.3
BUN/CREATININE RATIO: 16
BUN: 29
CALCIUM: 9.2
CARBON DIOXIDE, TOTAL: 19
CHLORIDE: 106
CREATININE: 1.82
EGFR: 29
EOS (ABSOLUTE): 0.2
EOS: 3
GLOBULIN, TOTAL: 2.9
GLUCOSE: 88
HEMATOCRIT: 35.9
HEMATOLOGY COMMENTS:: (no result)
HEMOGLOBIN: 10.3
IMMATURE CELLS: (no result)
IMMATURE GRANS (ABS): 0
IMMATURE GRANULOCYTES: 0
INR: 1
LYMPHS (ABSOLUTE): 1.7
LYMPHS: 30
MCH: 23.6
MCHC: 28.7
MCV: 82
MONOCYTES(ABSOLUTE): 0.4
MONOCYTES: 7
NEUTROPHILS (ABSOLUTE): 3.4
NEUTROPHILS: 59
NRBC: (no result)
PLATELETS: 242
POTASSIUM: 5.3
PROTEIN, TOTAL: 7.3
PROTHROMBIN TIME: 10.3
RBC: 4.36
RDW: 15.4
SODIUM: 142
WBC: 5.7

## 2022-06-09 ENCOUNTER — TELEPHONE ENCOUNTER (OUTPATIENT)
Dept: URBAN - METROPOLITAN AREA CLINIC 92 | Facility: CLINIC | Age: 72
End: 2022-06-09

## 2022-06-09 LAB
BUN/CREATININE RATIO: 16
BUN: 23
CARBON DIOXIDE, TOTAL: 20
CHLORIDE: 106
CREATININE: 1.4
EGFR: 40
GLUCOSE: 93
POTASSIUM: 5.5
SODIUM: 141

## 2022-06-09 RX ORDER — HYOSCYAMINE SULFATE 0.12 MG/1
1 TABLET UNDER THE TONGUE AND ALLOW TO DISSOLVE  AS NEEDED TABLET SUBLINGUAL
Qty: 60 | Refills: 5 | Status: ACTIVE | COMMUNITY

## 2022-06-09 RX ORDER — SODIUM POLYSTYRENE SULFONATE 15 G/60ML
60 ML SUSPENSION ORAL; RECTAL TWICE DAILY
Qty: 360 MILLILITERS | Refills: 0 | OUTPATIENT
Start: 2022-06-09

## 2022-06-09 RX ORDER — NADOLOL 20 MG/1
TAKE 0.5 TABLET BY ORAL ROUTE DAILY TABLET ORAL
Qty: 15 | Refills: 11 | Status: ACTIVE | COMMUNITY
Start: 2019-11-04

## 2022-06-09 RX ORDER — ASPIRIN 81 MG/1
TAKE 1 TABLET (81 MG) BY ORAL ROUTE ONCE DAILY TABLET, COATED ORAL 1
Qty: 0 | Refills: 0 | Status: ACTIVE | COMMUNITY
Start: 1900-01-01

## 2022-06-09 RX ORDER — AMLODIPINE BESYLATE 5 MG
TABLET ORAL
Qty: 0 | Refills: 0 | Status: ACTIVE | COMMUNITY
Start: 1900-01-01

## 2022-06-09 RX ORDER — LORATADINE/PSEUDOEPHEDRINE 5 MG-120MG
TABLET, EXTENDED RELEASE 12 HR ORAL
Qty: 0 | Refills: 0 | Status: ACTIVE | COMMUNITY
Start: 1900-01-01

## 2022-06-09 RX ORDER — PANTOPRAZOLE SODIUM 40 MG/1
1 TABLET TABLET, DELAYED RELEASE ORAL
Qty: 180 TABLET | Refills: 3 | Status: ACTIVE | COMMUNITY

## 2022-06-09 RX ORDER — SUCRALFATE 1 G/1
1 PO BID BEFORE LUNCH AND BEDTIME NOT W/I 1 HOUR OF OTHER MEDS TABLET ORAL BID
Qty: 180 TABLET | Refills: 3 | Status: ACTIVE | COMMUNITY
Start: 2021-10-15 | End: 2022-10-10

## 2022-06-09 RX ORDER — ALENDRONATE SODIUM 70 MG
TABLET ORAL
Qty: 0 | Refills: 0 | Status: ACTIVE | COMMUNITY
Start: 1900-01-01

## 2022-06-09 RX ORDER — METFORMIN HYDROCHLORIDE 500 MG/1
TABLET, COATED ORAL
Qty: 0 | Refills: 0 | Status: ACTIVE | COMMUNITY
Start: 1900-01-01

## 2022-06-09 RX ORDER — SPIRONOLACTONE 25 MG/1
TABLET ORAL
Qty: 0 | Refills: 0 | Status: ACTIVE | COMMUNITY
Start: 1900-01-01

## 2022-06-13 ENCOUNTER — LAB OUTSIDE AN ENCOUNTER (OUTPATIENT)
Dept: URBAN - NONMETROPOLITAN AREA CLINIC 2 | Facility: CLINIC | Age: 72
End: 2022-06-13

## 2022-06-14 ENCOUNTER — TELEPHONE ENCOUNTER (OUTPATIENT)
Dept: URBAN - METROPOLITAN AREA CLINIC 92 | Facility: CLINIC | Age: 72
End: 2022-06-14

## 2022-06-14 LAB
BUN/CREATININE RATIO: 11
BUN: 15
CARBON DIOXIDE, TOTAL: 24
CHLORIDE: 103
CREATININE: 1.36
EGFR: 42
GLUCOSE: 116
POTASSIUM: 4.2
SODIUM: 142

## 2022-07-05 ENCOUNTER — ERX REFILL RESPONSE (OUTPATIENT)
Dept: URBAN - NONMETROPOLITAN AREA CLINIC 2 | Facility: CLINIC | Age: 72
End: 2022-07-05

## 2022-07-05 RX ORDER — PANTOPRAZOLE SODIUM 40 MG/1
1 TABLET TABLET, DELAYED RELEASE ORAL
Qty: 180 TABLET | Refills: 3 | OUTPATIENT

## 2022-08-14 ENCOUNTER — WEB ENCOUNTER (OUTPATIENT)
Dept: URBAN - NONMETROPOLITAN AREA CLINIC 2 | Facility: CLINIC | Age: 72
End: 2022-08-14

## 2022-08-18 ENCOUNTER — LAB OUTSIDE AN ENCOUNTER (OUTPATIENT)
Dept: URBAN - NONMETROPOLITAN AREA CLINIC 2 | Facility: CLINIC | Age: 72
End: 2022-08-18

## 2022-08-18 ENCOUNTER — OFFICE VISIT (OUTPATIENT)
Dept: URBAN - METROPOLITAN AREA MEDICAL CENTER 1 | Facility: MEDICAL CENTER | Age: 72
End: 2022-08-18
Payer: MEDICARE

## 2022-08-18 DIAGNOSIS — K20.80 ESOPHAGITIS DISSECANS SUPERFICIALIS: ICD-10-CM

## 2022-08-18 DIAGNOSIS — K29.30 CHRONIC SUPERFICIAL GASTRITIS: ICD-10-CM

## 2022-08-18 PROCEDURE — 43239 EGD BIOPSY SINGLE/MULTIPLE: CPT | Performed by: INTERNAL MEDICINE

## 2022-08-26 ENCOUNTER — WEB ENCOUNTER (OUTPATIENT)
Dept: URBAN - NONMETROPOLITAN AREA CLINIC 2 | Facility: CLINIC | Age: 72
End: 2022-08-26

## 2022-08-26 RX ORDER — SUCRALFATE 1 G/1
1 TABLET ON AN EMPTY STOMACH TABLET ORAL TWICE A DAY
Qty: 180 TABLET | Refills: 3
Start: 2021-10-15 | End: 2023-08-24

## 2022-08-29 ENCOUNTER — WEB ENCOUNTER (OUTPATIENT)
Dept: URBAN - NONMETROPOLITAN AREA CLINIC 2 | Facility: CLINIC | Age: 72
End: 2022-08-29

## 2022-08-31 ENCOUNTER — LAB OUTSIDE AN ENCOUNTER (OUTPATIENT)
Dept: URBAN - NONMETROPOLITAN AREA CLINIC 2 | Facility: CLINIC | Age: 72
End: 2022-08-31

## 2022-09-01 ENCOUNTER — WEB ENCOUNTER (OUTPATIENT)
Dept: URBAN - NONMETROPOLITAN AREA CLINIC 2 | Facility: CLINIC | Age: 72
End: 2022-09-01

## 2022-09-12 ENCOUNTER — WEB ENCOUNTER (OUTPATIENT)
Dept: URBAN - NONMETROPOLITAN AREA CLINIC 2 | Facility: CLINIC | Age: 72
End: 2022-09-12

## 2022-09-13 ENCOUNTER — TELEPHONE ENCOUNTER (OUTPATIENT)
Dept: URBAN - METROPOLITAN AREA CLINIC 92 | Facility: CLINIC | Age: 72
End: 2022-09-13

## 2022-09-16 ENCOUNTER — OFFICE VISIT (OUTPATIENT)
Dept: URBAN - NONMETROPOLITAN AREA CLINIC 2 | Facility: CLINIC | Age: 72
End: 2022-09-16
Payer: MEDICARE

## 2022-09-16 ENCOUNTER — LAB OUTSIDE AN ENCOUNTER (OUTPATIENT)
Dept: URBAN - NONMETROPOLITAN AREA CLINIC 2 | Facility: CLINIC | Age: 72
End: 2022-09-16

## 2022-09-16 VITALS
HEIGHT: 68 IN | BODY MASS INDEX: 32.37 KG/M2 | TEMPERATURE: 97.7 F | HEART RATE: 81 BPM | WEIGHT: 213.6 LBS | DIASTOLIC BLOOD PRESSURE: 80 MMHG | SYSTOLIC BLOOD PRESSURE: 117 MMHG

## 2022-09-16 DIAGNOSIS — K76.0 FATTY LIVER: ICD-10-CM

## 2022-09-16 DIAGNOSIS — R10.13 EPIGASTRIC ABDOMINAL PAIN: ICD-10-CM

## 2022-09-16 DIAGNOSIS — K58.9 IRRITABLE BOWEL SYNDROME: ICD-10-CM

## 2022-09-16 DIAGNOSIS — K76.9 LIVER LESION: ICD-10-CM

## 2022-09-16 DIAGNOSIS — K74.60 CIRRHOSIS: ICD-10-CM

## 2022-09-16 DIAGNOSIS — K44.9 HIATAL HERNIA: ICD-10-CM

## 2022-09-16 DIAGNOSIS — K57.90 DIVERTICULOSIS: ICD-10-CM

## 2022-09-16 DIAGNOSIS — K31.84 GASTROPARESIS: ICD-10-CM

## 2022-09-16 DIAGNOSIS — I85.00 ESOPHAGEAL VARICES: ICD-10-CM

## 2022-09-16 DIAGNOSIS — K21.9 GERD (GASTROESOPHAGEAL REFLUX DISEASE): ICD-10-CM

## 2022-09-16 DIAGNOSIS — Z86.010 PERSONAL HISTORY OF COLONIC POLYPS: ICD-10-CM

## 2022-09-16 PROCEDURE — 99214 OFFICE O/P EST MOD 30 MIN: CPT | Performed by: NURSE PRACTITIONER

## 2022-09-16 RX ORDER — METFORMIN HYDROCHLORIDE 500 MG/1
TABLET, COATED ORAL
Qty: 0 | Refills: 0 | Status: ACTIVE | COMMUNITY
Start: 1900-01-01

## 2022-09-16 RX ORDER — AMLODIPINE BESYLATE 5 MG
TABLET ORAL
Qty: 0 | Refills: 0 | Status: ACTIVE | COMMUNITY
Start: 1900-01-01

## 2022-09-16 RX ORDER — LORATADINE/PSEUDOEPHEDRINE 5 MG-120MG
TABLET, EXTENDED RELEASE 12 HR ORAL
Qty: 0 | Refills: 0 | Status: ACTIVE | COMMUNITY
Start: 1900-01-01

## 2022-09-16 RX ORDER — PANTOPRAZOLE SODIUM 40 MG/1
1 TABLET TABLET, DELAYED RELEASE ORAL
Qty: 180 TABLET | Refills: 3 | Status: ACTIVE | COMMUNITY

## 2022-09-16 RX ORDER — SODIUM POLYSTYRENE SULFONATE 15 G/60ML
60 ML SUSPENSION ORAL; RECTAL TWICE DAILY
Qty: 360 MILLILITERS | Refills: 0 | Status: ACTIVE | COMMUNITY
Start: 2022-06-09

## 2022-09-16 RX ORDER — ASPIRIN 81 MG/1
TAKE 1 TABLET (81 MG) BY ORAL ROUTE ONCE DAILY TABLET, COATED ORAL 1
Qty: 0 | Refills: 0 | Status: ACTIVE | COMMUNITY
Start: 1900-01-01

## 2022-09-16 RX ORDER — HYOSCYAMINE SULFATE 0.12 MG/1
1 TABLET UNDER THE TONGUE AND ALLOW TO DISSOLVE  AS NEEDED TABLET SUBLINGUAL
Qty: 60 TABLET | Refills: 3 | OUTPATIENT
Start: 2022-09-16 | End: 2023-01-13

## 2022-09-16 RX ORDER — HYOSCYAMINE SULFATE 0.12 MG/1
1 TABLET UNDER THE TONGUE AND ALLOW TO DISSOLVE  AS NEEDED TABLET SUBLINGUAL
Qty: 60 | Refills: 5 | Status: ACTIVE | COMMUNITY

## 2022-09-16 RX ORDER — SPIRONOLACTONE 25 MG/1
TABLET ORAL
Qty: 0 | Refills: 0 | Status: ACTIVE | COMMUNITY
Start: 1900-01-01

## 2022-09-16 RX ORDER — SUCRALFATE 1 G/1
1 TABLET ON AN EMPTY STOMACH TABLET ORAL TWICE A DAY
Qty: 180 TABLET | Refills: 3 | Status: ACTIVE | COMMUNITY
Start: 2021-10-15 | End: 2023-08-24

## 2022-09-16 RX ORDER — NADOLOL 20 MG/1
TAKE 0.5 TABLET BY ORAL ROUTE DAILY TABLET ORAL
Qty: 15 | Refills: 11 | Status: ACTIVE | COMMUNITY
Start: 2019-11-04

## 2022-09-16 RX ORDER — ALENDRONATE SODIUM 70 MG
TABLET ORAL
Qty: 0 | Refills: 0 | Status: ACTIVE | COMMUNITY
Start: 1900-01-01

## 2022-09-16 NOTE — HPI-TODAY'S VISIT:
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 Since her last visit she status post EGD for [facility] screening. She has no recurrence on her repeat endoscopy in August. She is stable on nadolol 10 mg twice daily. She denies any abdominal distention or swelling, she has gained weight during the pandemic with the quarantine and agrees that this is due to poor nutritional choices. We continue to discuss low-sodium diet which she tries to maintain for her liver and heart health. Her reflux is stable on high-dose PPI twice daily. She agrees with some weight loss we will likely be able to wean this. She is due for repeat lab work and imaging for HCC screening. Her last ultrasound was in April with no lesions. Her meld this spring was 7. Today she has no symptoms of encephalopathy, jaundice, or pruritus. She is doing well otherwise with no new GI complaints. 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  Since her last visit her labs and ultrasound are stable.  She is down 9 pounds and continues to work on healthy weight loss.  She denies any breakthrough reflux dysphagia or other associated GI symptoms.  She is due for repeat labs and ultrasound this morning.  She is due for repeat Leyla screening this fall.  Today she is doing well otherwise with no new GI complaints and agree to repeat labs and ultrasound.  MB   11/8/2021 Sophie presents for follow-up of end-stage liver disease, fatty liver, reflux with a history of gastroparesis, sequela of liver disease including history of esophageal varices on imaging, a liver lesion noted and stable, and new onset right upper quadrant abdominal pain. Last month she messaged us with postprandial right upper quadrant abdominal pain. She had blood work with normal synthetic function, ultrasound imaging prior to that with no obstructive process. She is due for varix screening. She states this is worse after a meal. She does take PPI twice daily. She states the pain feels similar to her gallbladder but more in the middle. Her blood sugars have been labile, her gastroparesis had improved off narcotics but she does feel like things may be flaring. Today the discomfort seems to have improved somewhat. She is working on weight loss. She does agree to repeat EGD for [facility] screening and further evaluation of her discomfort, she does agree to try FD guard as needed. MB 2/4/2022 Sophie presents for endoscopy follow-up.  Her repeat EGD reveals no varices and a small amount of retained food.  Her right upper quadrant pain has improved on FD guard as needed.  Her reflux is stable on twice daily PPI.  She continues to have a low meld, she is due for repeat labs and ultrasound in May 2022, her labs and ultrasound are stable in November 2021, she does have a calcified granuloma in the left lobe.  She is struggling with arthritis and has been on prednisone so her blood sugars and weight have increased.  She continues to work on small meals.  Overall today she is doing well with no new GI complaints.  She is due for repeat colonoscopy in May 2022.  MB 6/6/2022 Sophie presents for follow-up of end-stage liver disease secondary to NAFLD D with sequela of portal hypertension and esophageal varices.  Since her last visit her repeat colonoscopy reveals 1 tubular adenoma left-sided diverticular disease.  She was on 3 capsules of Metamucil daily and felt that this caused increased diarrhea.  She has occasional episodes of incontinence and loose urgent bowel movements.  We have discussed restarting a low-dose at 1 capsule at night given her diverticular disease specifically.  Her reflux is stable on pantoprazole twice daily.  She is on nadolol 20 mg daily.  She is due for repeat labs and HCC screening.  Her very screening will be due in December.  Today she is doing well with no new GI complaints.  She continues to work on tight glucose control and weight control.  She has lost approximately 6 pounds since her last visit.  Today she is doing well otherwise with no new GI complaints.  MB 9/16/2022 Rocco presents for evaluation of worsening epigastric pain associated with dysphagia.  Since her last visit she developed worsening substernal pressure while swallowing.  She states she feels like an air bubble gets trapped.  She also describes a spasm.  This is only associated with eating, this is not exertional.  Her EGD 8/2022 is fairly normal with gastritis, reflux esophagitis, no varices.  Her ultrasound shows no new lesions or stones in her bile duct.  She is status post cholecystectomy.  She is on pantoprazole 40 mg twice daily with no relief.  She does have a history of a hiatal hernia.  Today this is her main complaint.  I am concerned she is having esophageal spasm.  I am going to prescribe Levsin as needed.  We will do a barium study to assess her hernia and look for tertiary contractions.  If this is normal consider esophageal manometry.  Her liver disease is stable, her meld is low.  She has no sequela of her disease.  Her bowels are moving regularly on psyllium daily.  Today her main complaint is epigastric abdominal pain associated with dysphagia.  MB

## 2022-10-10 ENCOUNTER — TELEPHONE ENCOUNTER (OUTPATIENT)
Dept: URBAN - METROPOLITAN AREA CLINIC 92 | Facility: CLINIC | Age: 72
End: 2022-10-10

## 2022-10-10 RX ORDER — HYOSCYAMINE SULFATE 0.12 MG/1
1 TABLET UNDER THE TONGUE AND ALLOW TO DISSOLVE  AS NEEDED TABLET SUBLINGUAL
Qty: 60 TABLET | Refills: 3
Start: 2022-09-16 | End: 2023-02-09

## 2022-10-24 ENCOUNTER — ERX REFILL RESPONSE (OUTPATIENT)
Dept: URBAN - NONMETROPOLITAN AREA CLINIC 2 | Facility: CLINIC | Age: 72
End: 2022-10-24

## 2022-10-24 RX ORDER — PANTOPRAZOLE SODIUM 40 MG/1
TAKE 1 TABLET TWICE DAILY BEFORE MEALS TABLET, DELAYED RELEASE ORAL
Qty: 180 TABLET | Refills: 0 | OUTPATIENT

## 2022-10-24 RX ORDER — PANTOPRAZOLE SODIUM 40 MG/1
TAKE 1 TABLET TWICE DAILY BEFORE MEALS TABLET, DELAYED RELEASE ORAL
Qty: 180 TABLET | Refills: 3 | OUTPATIENT

## 2022-10-31 ENCOUNTER — WEB ENCOUNTER (OUTPATIENT)
Dept: URBAN - NONMETROPOLITAN AREA CLINIC 2 | Facility: CLINIC | Age: 72
End: 2022-10-31

## 2022-11-02 ENCOUNTER — WEB ENCOUNTER (OUTPATIENT)
Dept: URBAN - NONMETROPOLITAN AREA CLINIC 2 | Facility: CLINIC | Age: 72
End: 2022-11-02

## 2022-12-07 ENCOUNTER — OFFICE VISIT (OUTPATIENT)
Dept: URBAN - METROPOLITAN AREA TELEHEALTH 2 | Facility: TELEHEALTH | Age: 72
End: 2022-12-07
Payer: MEDICARE

## 2022-12-07 DIAGNOSIS — K31.84 GASTROPARESIS: ICD-10-CM

## 2022-12-07 DIAGNOSIS — K76.0 FATTY LIVER: ICD-10-CM

## 2022-12-07 DIAGNOSIS — R10.13 EPIGASTRIC ABDOMINAL PAIN: ICD-10-CM

## 2022-12-07 DIAGNOSIS — K44.9 HIATAL HERNIA: ICD-10-CM

## 2022-12-07 PROBLEM — 300331000: Status: ACTIVE | Noted: 2021-06-04

## 2022-12-07 PROBLEM — 428283002: Status: ACTIVE | Noted: 2020-06-18

## 2022-12-07 PROCEDURE — 99214 OFFICE O/P EST MOD 30 MIN: CPT | Performed by: NURSE PRACTITIONER

## 2022-12-07 RX ORDER — ALENDRONATE SODIUM 70 MG
TABLET ORAL
Qty: 0 | Refills: 0 | Status: ACTIVE | COMMUNITY
Start: 1900-01-01

## 2022-12-07 RX ORDER — AMITRIPTYLINE HYDROCHLORIDE 10 MG/1
1 TABLET AT BEDTIME TABLET, FILM COATED ORAL ONCE A DAY
Qty: 90 TABLET | Refills: 3 | OUTPATIENT
Start: 2022-12-07

## 2022-12-07 RX ORDER — SODIUM POLYSTYRENE SULFONATE 15 G/60ML
60 ML SUSPENSION ORAL; RECTAL TWICE DAILY
Qty: 360 MILLILITERS | Refills: 0 | Status: ACTIVE | COMMUNITY
Start: 2022-06-09

## 2022-12-07 RX ORDER — AMLODIPINE BESYLATE 5 MG
TABLET ORAL
Qty: 0 | Refills: 0 | Status: ON HOLD | COMMUNITY
Start: 1900-01-01

## 2022-12-07 RX ORDER — SPIRONOLACTONE 25 MG/1
TABLET ORAL
Qty: 0 | Refills: 0 | Status: ACTIVE | COMMUNITY
Start: 1900-01-01

## 2022-12-07 RX ORDER — NADOLOL 20 MG/1
TAKE 0.5 TABLET BY ORAL ROUTE DAILY TABLET ORAL
Qty: 15 | Refills: 11 | Status: ON HOLD | COMMUNITY
Start: 2019-11-04

## 2022-12-07 RX ORDER — LORATADINE/PSEUDOEPHEDRINE 5 MG-120MG
TABLET, EXTENDED RELEASE 12 HR ORAL
Qty: 0 | Refills: 0 | Status: ACTIVE | COMMUNITY
Start: 1900-01-01

## 2022-12-07 RX ORDER — SUCRALFATE 1 G/1
1 TABLET ON AN EMPTY STOMACH TABLET ORAL TWICE A DAY
Qty: 180 TABLET | Refills: 3 | Status: ACTIVE | COMMUNITY
Start: 2021-10-15 | End: 2023-08-24

## 2022-12-07 RX ORDER — PANTOPRAZOLE SODIUM 40 MG/1
TAKE 1 TABLET TWICE DAILY BEFORE MEALS TABLET, DELAYED RELEASE ORAL
Qty: 180 TABLET | Refills: 3 | Status: ACTIVE | COMMUNITY

## 2022-12-07 RX ORDER — HYOSCYAMINE SULFATE 0.12 MG/1
1 TABLET UNDER THE TONGUE AND ALLOW TO DISSOLVE  AS NEEDED TABLET SUBLINGUAL
Qty: 60 | Refills: 5 | Status: ACTIVE | COMMUNITY

## 2022-12-07 RX ORDER — METFORMIN HYDROCHLORIDE 500 MG/1
TABLET, COATED ORAL
Qty: 0 | Refills: 0 | Status: ACTIVE | COMMUNITY
Start: 1900-01-01

## 2022-12-07 RX ORDER — ASPIRIN 81 MG/1
TAKE 1 TABLET (81 MG) BY ORAL ROUTE ONCE DAILY TABLET, COATED ORAL 1
Qty: 0 | Refills: 0 | Status: ACTIVE | COMMUNITY
Start: 1900-01-01

## 2022-12-07 NOTE — HPI-TODAY'S VISIT:
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 Since her last visit she status post EGD for [facility] screening. She has no recurrence on her repeat endoscopy in August. She is stable on nadolol 10 mg twice daily. She denies any abdominal distention or swelling, she has gained weight during the pandemic with the quarantine and agrees that this is due to poor nutritional choices. We continue to discuss low-sodium diet which she tries to maintain for her liver and heart health. Her reflux is stable on high-dose PPI twice daily. She agrees with some weight loss we will likely be able to wean this. She is due for repeat lab work and imaging for HCC screening. Her last ultrasound was in April with no lesions. Her meld this spring was 7. Today she has no symptoms of encephalopathy, jaundice, or pruritus. She is doing well otherwise with no new GI complaints. 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  Since her last visit her labs and ultrasound are stable.  She is down 9 pounds and continues to work on healthy weight loss.  She denies any breakthrough reflux dysphagia or other associated GI symptoms.  She is due for repeat labs and ultrasound this morning.  She is due for repeat Leyla screening this fall.  Today she is doing well otherwise with no new GI complaints and agree to repeat labs and ultrasound.  MB   11/8/2021 Sophie presents for follow-up of end-stage liver disease, fatty liver, reflux with a history of gastroparesis, sequela of liver disease including history of esophageal varices on imaging, a liver lesion noted and stable, and new onset right upper quadrant abdominal pain. Last month she messaged us with postprandial right upper quadrant abdominal pain. She had blood work with normal synthetic function, ultrasound imaging prior to that with no obstructive process. She is due for varix screening. She states this is worse after a meal. She does take PPI twice daily. She states the pain feels similar to her gallbladder but more in the middle. Her blood sugars have been labile, her gastroparesis had improved off narcotics but she does feel like things may be flaring. Today the discomfort seems to have improved somewhat. She is working on weight loss. She does agree to repeat EGD for [facility]'s screening and further evaluation of her discomfort, she does agree to try FD guard as needed. MB 2/4/2022 Sophie presents for endoscopy follow-up.  Her repeat EGD reveals no varices and a small amount of retained food.  Her right upper quadrant pain has improved on FD guard as needed.  Her reflux is stable on twice daily PPI.  She continues to have a low meld, she is due for repeat labs and ultrasound in May 2022, her labs and ultrasound are stable in November 2021, she does have a calcified granuloma in the left lobe.  She is struggling with arthritis and has been on prednisone so her blood sugars and weight have increased.  She continues to work on small meals.  Overall today she is doing well with no new GI complaints.  She is due for repeat colonoscopy in May 2022.  MB 6/6/2022 Sophie presents for follow-up of end-stage liver disease secondary to NAFLD D with sequela of portal hypertension and esophageal varices.  Since her last visit her repeat colonoscopy reveals 1 tubular adenoma left-sided diverticular disease.  She was on 3 capsules of Metamucil daily and felt that this caused increased diarrhea.  She has occasional episodes of incontinence and loose urgent bowel movements.  We have discussed restarting a low-dose at 1 capsule at night given her diverticular disease specifically.  Her reflux is stable on pantoprazole twice daily.  She is on nadolol 20 mg daily.  She is due for repeat labs and HCC screening.  Her very screening will be due in December.  Today she is doing well with no new GI complaints.  She continues to work on tight glucose control and weight control.  She has lost approximately 6 pounds since her last visit.  Today she is doing well otherwise with no new GI complaints.  MB 9/16/2022 Rocco presents for evaluation of worsening epigastric pain associated with dysphagia.  Since her last visit she developed worsening substernal pressure while swallowing.  She states she feels like an air bubble gets trapped.  She also describes a spasm.  This is only associated with eating, this is not exertional.  Her EGD 8/2022 is fairly normal with gastritis, reflux esophagitis, no varices.  Her ultrasound shows no new lesions or stones in her bile duct.  She is status post cholecystectomy.  She is on pantoprazole 40 mg twice daily with no relief.  She does have a history of a hiatal hernia.  Today this is her main complaint.  I am concerned she is having esophageal spasm.  I am going to prescribe Levsin as needed.  We will do a barium study to assess her hernia and look for tertiary contractions.  If this is normal consider esophageal manometry.  Her liver disease is stable, her meld is low.  She has no sequela of her disease.  Her bowels are moving regularly on psyllium daily.  Today her main complaint is epigastric abdominal pain associated with dysphagia.  MB 12/7/2022 Rocco presents for follow up of epigastric abdominal pain, nausea, dysphagia, IBS and cirrhosis. Since her last visit she underwent repeat UGI with presbyesophagus and a small esophageal diverticulum. She is on pantoprole 40mg twice daily. She states her symptoms of pain have improved but she still has episodes once a week when she eats meat that the food seems to rise and fall in her chest and she ultimately is able to pass the bolus. Her partial has broken so she has not been chewing her food well. She has used the levsin when the food seems to stick with no change. She is on fosamax once a week. She is off MTX and on Humira rather than enbrel for her arthritis. She denies any NSAID use other than baby ASA. Her synthetic function has remained stable. She is not on nadolol but metoprolol. Today she is doing somewhat better but still with symptoms. MB  This telehealth visit was provided at the patient's home.

## 2023-05-29 ENCOUNTER — ERX REFILL RESPONSE (OUTPATIENT)
Dept: URBAN - NONMETROPOLITAN AREA CLINIC 2 | Facility: CLINIC | Age: 73
End: 2023-05-29

## 2023-05-29 RX ORDER — SUCRALFATE 1 G/1
1 TABLET ON AN EMPTY STOMACH TABLET ORAL TWICE A DAY
Qty: 180 TABLET | Refills: 3 | OUTPATIENT

## 2023-07-26 ENCOUNTER — WEB ENCOUNTER (OUTPATIENT)
Dept: URBAN - NONMETROPOLITAN AREA CLINIC 2 | Facility: CLINIC | Age: 73
End: 2023-07-26

## 2023-07-31 ENCOUNTER — WEB ENCOUNTER (OUTPATIENT)
Dept: URBAN - NONMETROPOLITAN AREA CLINIC 2 | Facility: CLINIC | Age: 73
End: 2023-07-31

## 2023-08-01 ENCOUNTER — WEB ENCOUNTER (OUTPATIENT)
Dept: URBAN - NONMETROPOLITAN AREA CLINIC 2 | Facility: CLINIC | Age: 73
End: 2023-08-01

## 2023-08-03 ENCOUNTER — LAB OUTSIDE AN ENCOUNTER (OUTPATIENT)
Dept: URBAN - NONMETROPOLITAN AREA CLINIC 2 | Facility: CLINIC | Age: 73
End: 2023-08-03

## 2023-08-03 ENCOUNTER — OFFICE VISIT (OUTPATIENT)
Dept: URBAN - NONMETROPOLITAN AREA CLINIC 2 | Facility: CLINIC | Age: 73
End: 2023-08-03
Payer: MEDICARE

## 2023-08-03 VITALS
SYSTOLIC BLOOD PRESSURE: 145 MMHG | DIASTOLIC BLOOD PRESSURE: 81 MMHG | HEART RATE: 69 BPM | BODY MASS INDEX: 34.16 KG/M2 | TEMPERATURE: 98.7 F | HEIGHT: 68 IN | WEIGHT: 225.4 LBS

## 2023-08-03 DIAGNOSIS — K58.8 OTHER IRRITABLE BOWEL SYNDROME: ICD-10-CM

## 2023-08-03 DIAGNOSIS — K21.9 ACID REFLUX: ICD-10-CM

## 2023-08-03 DIAGNOSIS — K31.84 GASTROPARESIS: ICD-10-CM

## 2023-08-03 DIAGNOSIS — K74.60 CIRRHOSIS: ICD-10-CM

## 2023-08-03 PROBLEM — 414133009: Status: ACTIVE | Noted: 2022-12-07

## 2023-08-03 PROCEDURE — 99214 OFFICE O/P EST MOD 30 MIN: CPT | Performed by: NURSE PRACTITIONER

## 2023-08-03 RX ORDER — HYOSCYAMINE SULFATE 0.12 MG/1
1 TABLET UNDER THE TONGUE AND ALLOW TO DISSOLVE  AS NEEDED TABLET SUBLINGUAL
Qty: 60 | Refills: 5 | Status: ACTIVE | COMMUNITY

## 2023-08-03 RX ORDER — PANTOPRAZOLE SODIUM 40 MG/1
TAKE 1 TABLET TWICE DAILY BEFORE MEALS TABLET, DELAYED RELEASE ORAL
Qty: 180 TABLET | Refills: 3 | Status: ACTIVE | COMMUNITY

## 2023-08-03 RX ORDER — SUCRALFATE 1 G/1
1 TABLET ON AN EMPTY STOMACH TABLET ORAL TWICE A DAY
Qty: 180 TABLET | Refills: 3 | Status: ACTIVE | COMMUNITY

## 2023-08-03 RX ORDER — SODIUM POLYSTYRENE SULFONATE 15 G/60ML
60 ML SUSPENSION ORAL; RECTAL TWICE DAILY
Qty: 360 MILLILITERS | Refills: 0 | Status: ACTIVE | COMMUNITY
Start: 2022-06-09

## 2023-08-03 RX ORDER — DICYCLOMINE HYDROCHLORIDE 10 MG/1
1 CAPSULES CAPSULE ORAL TWICE DAILY
Qty: 60 CAPSULES | Refills: 11 | OUTPATIENT
Start: 2023-08-03 | End: 2024-07-28

## 2023-08-03 RX ORDER — LORATADINE/PSEUDOEPHEDRINE 5 MG-120MG
TABLET, EXTENDED RELEASE 12 HR ORAL
Qty: 0 | Refills: 0 | Status: ACTIVE | COMMUNITY
Start: 1900-01-01

## 2023-08-03 RX ORDER — NADOLOL 20 MG/1
TAKE 0.5 TABLET BY ORAL ROUTE DAILY TABLET ORAL
Qty: 15 | Refills: 11 | Status: ON HOLD | COMMUNITY
Start: 2019-11-04

## 2023-08-03 RX ORDER — SPIRONOLACTONE 25 MG/1
TABLET ORAL
Qty: 0 | Refills: 0 | Status: ACTIVE | COMMUNITY
Start: 1900-01-01

## 2023-08-03 RX ORDER — ALENDRONATE SODIUM 70 MG
TABLET ORAL
Qty: 0 | Refills: 0 | Status: ACTIVE | COMMUNITY
Start: 1900-01-01

## 2023-08-03 RX ORDER — AMLODIPINE BESYLATE 5 MG
TABLET ORAL
Qty: 0 | Refills: 0 | Status: ON HOLD | COMMUNITY
Start: 1900-01-01

## 2023-08-03 RX ORDER — RIFAXIMIN 550 MG/1
1 TABLET TABLET ORAL THREE TIMES A DAY
Qty: 42 TABLET | Refills: 0 | OUTPATIENT
Start: 2023-08-03 | End: 2023-08-17

## 2023-08-03 RX ORDER — AMITRIPTYLINE HYDROCHLORIDE 10 MG/1
1 TABLET AT BEDTIME TABLET, FILM COATED ORAL ONCE A DAY
Qty: 90 TABLET | Refills: 3 | Status: ACTIVE | COMMUNITY
Start: 2022-12-07

## 2023-08-03 RX ORDER — ASPIRIN 81 MG/1
TAKE 1 TABLET (81 MG) BY ORAL ROUTE ONCE DAILY TABLET, COATED ORAL 1
Qty: 0 | Refills: 0 | Status: ACTIVE | COMMUNITY
Start: 1900-01-01

## 2023-08-03 RX ORDER — METFORMIN HYDROCHLORIDE 500 MG/1
TABLET, COATED ORAL
Qty: 0 | Refills: 0 | Status: ACTIVE | COMMUNITY
Start: 1900-01-01

## 2023-08-03 NOTE — HPI-TODAY'S VISIT:
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 Since her last visit she status post EGD for [facility]'s screening. She has no recurrence on her repeat endoscopy in August. She is stable on nadolol 10 mg twice daily. She denies any abdominal distention or swelling, she has gained weight during the pandemic with the quarantine and agrees that this is due to poor nutritional choices. We continue to discuss low-sodium diet which she tries to maintain for her liver and heart health. Her reflux is stable on high-dose PPI twice daily. She agrees with some weight loss we will likely be able to wean this. She is due for repeat lab work and imaging for HCC screening. Her last ultrasound was in April with no lesions. Her meld this spring was 7. Today she has no symptoms of encephalopathy, jaundice, or pruritus. She is doing well otherwise with no new GI complaints. 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  Since her last visit her labs and ultrasound are stable.  She is down 9 pounds and continues to work on healthy weight loss.  She denies any breakthrough reflux dysphagia or other associated GI symptoms.  She is due for repeat labs and ultrasound this morning.  She is due for repeat Leyla screening this fall.  Today she is doing well otherwise with no new GI complaints and agree to repeat labs and ultrasound.  MB   11/8/2021 Sophie presents for follow-up of end-stage liver disease, fatty liver, reflux with a history of gastroparesis, sequela of liver disease including history of esophageal varices on imaging, a liver lesion noted and stable, and new onset right upper quadrant abdominal pain. Last month she messaged us with postprandial right upper quadrant abdominal pain. She had blood work with normal synthetic function, ultrasound imaging prior to that with no obstructive process. She is due for varix screening. She states this is worse after a meal. She does take PPI twice daily. She states the pain feels similar to her gallbladder but more in the middle. Her blood sugars have been labile, her gastroparesis had improved off narcotics but she does feel like things may be flaring. Today the discomfort seems to have improved somewhat. She is working on weight loss. She does agree to repeat EGD for Park's screening and further evaluation of her discomfort, she does agree to try FD guard as needed. MB 2/4/2022 Sophie presents for endoscopy follow-up.  Her repeat EGD reveals no varices and a small amount of retained food.  Her right upper quadrant pain has improved on FD guard as needed.  Her reflux is stable on twice daily PPI.  She continues to have a low meld, she is due for repeat labs and ultrasound in May 2022, her labs and ultrasound are stable in November 2021, she does have a calcified granuloma in the left lobe.  She is struggling with arthritis and has been on prednisone so her blood sugars and weight have increased.  She continues to work on small meals.  Overall today she is doing well with no new GI complaints.  She is due for repeat colonoscopy in May 2022.  MB 6/6/2022 Sophie presents for follow-up of end-stage liver disease secondary to NAFLD D with sequela of portal hypertension and esophageal varices.  Since her last visit her repeat colonoscopy reveals 1 tubular adenoma left-sided diverticular disease.  She was on 3 capsules of Metamucil daily and felt that this caused increased diarrhea.  She has occasional episodes of incontinence and loose urgent bowel movements.  We have discussed restarting a low-dose at 1 capsule at night given her diverticular disease specifically.  Her reflux is stable on pantoprazole twice daily.  She is on nadolol 20 mg daily.  She is due for repeat labs and HCC screening.  Her very screening will be due in December.  Today she is doing well with no new GI complaints.  She continues to work on tight glucose control and weight control.  She has lost approximately 6 pounds since her last visit.  Today she is doing well otherwise with no new GI complaints.  MB 9/16/2022 Rocco presents for evaluation of worsening epigastric pain associated with dysphagia.  Since her last visit she developed worsening substernal pressure while swallowing.  She states she feels like an air bubble gets trapped.  She also describes a spasm.  This is only associated with eating, this is not exertional.  Her EGD 8/2022 is fairly normal with gastritis, reflux esophagitis, no varices.  Her ultrasound shows no new lesions or stones in her bile duct.  She is status post cholecystectomy.  She is on pantoprazole 40 mg twice daily with no relief.  She does have a history of a hiatal hernia.  Today this is her main complaint.  I am concerned she is having esophageal spasm.  I am going to prescribe Levsin as needed.  We will do a barium study to assess her hernia and look for tertiary contractions.  If this is normal consider esophageal manometry.  Her liver disease is stable, her meld is low.  She has no sequela of her disease.  Her bowels are moving regularly on psyllium daily.  Today her main complaint is epigastric abdominal pain associated with dysphagia.  MB 12/7/2022 Rocco presents for follow up of epigastric abdominal pain, nausea, dysphagia, IBS and cirrhosis. Since her last visit she underwent repeat UGI with presbyesophagus and a small esophageal diverticulum. She is on pantoprole 40mg twice daily. She states her symptoms of pain have improved but she still has episodes once a week when she eats meat that the food seems to rise and fall in her chest and she ultimately is able to pass the bolus. Her partial has broken so she has not been chewing her food well. She has used the levsin when the food seems to stick with no change. She is on fosamax once a week. She is off MTX and on Humira rather than enbrel for her arthritis. She denies any NSAID use other than baby ASA. Her synthetic function has remained stable. She is not on nadolol but metoprolol. Today she is doing somewhat better but still with symptoms. MB  This telehealth visit was provided at the patient's home. 8/3/2023 Sophie presents for follow-up of end-stage liver disease secondary to fatty liver, reflux, and new onset fecal incontinence.  6 weeks ago she developed multiple loose/soft/formed episodes of incontinence, this is happening once or twice a week.  She cannot identify any food triggers.  She is no longer taking Colace.  She is not on psyllium as previously discussed.  Her last colonoscopy reveals left-sided diverticulosis without hemorrhoids done in May 2022.  No random biopsies were taken at that time.  She denies any sick contacts.  She does cheat keep her young grandchildren.  Today she agrees to stool studies.  We will start low-dose psyllium and dicyclomine twice daily.  If her work-up is negative consider a trial of Xifaxan for postinfectious IBS-D.  She is due for repeat labs and ultrasound imaging for surveillance of her liver disease.  Her EGD last year shows no varices and we can repeat at the 2-year interval.  MB

## 2023-08-04 LAB
A/G RATIO: 1.3
ABSOLUTE BASOPHILS: 22
ABSOLUTE EOSINOPHILS: 50
ABSOLUTE LYMPHOCYTES: 2138
ABSOLUTE MONOCYTES: 454
ABSOLUTE NEUTROPHILS: 4536
AFP, SERUM, TUMOR MARKER: 5.3
ALBUMIN: 3.9
ALKALINE PHOSPHATASE: 100
ALT (SGPT): 17
AST (SGOT): 15
BASOPHILS: 0.3
BILIRUBIN, TOTAL: 0.4
BUN/CREATININE RATIO: 26
BUN: 45
CALCIUM: 9.6
CARBON DIOXIDE, TOTAL: 25
CHLORIDE: 107
CREATININE: 1.76
EGFR: 30
EOSINOPHILS: 0.7
GLOBULIN, TOTAL: 3.1
GLUCOSE: 110
HEMATOCRIT: 36.1
HEMOGLOBIN: 11.3
INR: 1
LYMPHOCYTES: 29.7
MCH: 25.1
MCHC: 31.3
MCV: 80
MONOCYTES: 6.3
MPV: 11.1
NEUTROPHILS: 63
PLATELET COUNT: 223
POTASSIUM: 5
PROTEIN, TOTAL: 7
PT: 10.3
RDW: 16.3
RED BLOOD CELL COUNT: 4.51
SODIUM: 142
WHITE BLOOD CELL COUNT: 7.2

## 2023-08-07 ENCOUNTER — TELEPHONE ENCOUNTER (OUTPATIENT)
Dept: URBAN - NONMETROPOLITAN AREA CLINIC 2 | Facility: CLINIC | Age: 73
End: 2023-08-07

## 2023-08-17 ENCOUNTER — LAB OUTSIDE AN ENCOUNTER (OUTPATIENT)
Dept: URBAN - NONMETROPOLITAN AREA CLINIC 2 | Facility: CLINIC | Age: 73
End: 2023-08-17

## 2023-08-30 LAB
CAMPYLOBACTER SPP. AG,EIA: (no result)
CLOSTRIDIUM DIFFICILE: (no result)
GIARDIA AG, EIA, STOOL: (no result)
LEUKOCYTES: (no result)
OVA AND PARASITES, CONC AND PERM SMEAR: (no result)
SALMONELLA AND SHIGELLA, CULTURE: (no result)
SHIGA TOXINS, EIA W/RFL TO E.COLI O157 CULTURE: (no result)

## 2023-08-31 ENCOUNTER — TELEPHONE ENCOUNTER (OUTPATIENT)
Dept: URBAN - NONMETROPOLITAN AREA CLINIC 2 | Facility: CLINIC | Age: 73
End: 2023-08-31

## 2024-01-18 ENCOUNTER — OFFICE VISIT (OUTPATIENT)
Dept: URBAN - NONMETROPOLITAN AREA CLINIC 2 | Facility: CLINIC | Age: 74
End: 2024-01-18

## 2024-01-31 ENCOUNTER — LAB OUTSIDE AN ENCOUNTER (OUTPATIENT)
Dept: URBAN - METROPOLITAN AREA TELEHEALTH 2 | Facility: TELEHEALTH | Age: 74
End: 2024-01-31

## 2024-01-31 ENCOUNTER — OFFICE VISIT (OUTPATIENT)
Dept: URBAN - METROPOLITAN AREA TELEHEALTH 2 | Facility: TELEHEALTH | Age: 74
End: 2024-01-31
Payer: MEDICARE

## 2024-01-31 ENCOUNTER — DASHBOARD ENCOUNTERS (OUTPATIENT)
Age: 74
End: 2024-01-31

## 2024-01-31 ENCOUNTER — TELEPHONE ENCOUNTER (OUTPATIENT)
Dept: URBAN - NONMETROPOLITAN AREA CLINIC 2 | Facility: CLINIC | Age: 74
End: 2024-01-31

## 2024-01-31 DIAGNOSIS — K31.84 GASTROPARESIS: ICD-10-CM

## 2024-01-31 DIAGNOSIS — K74.60 CIRRHOSIS: ICD-10-CM

## 2024-01-31 DIAGNOSIS — K44.9 HIATAL HERNIA: ICD-10-CM

## 2024-01-31 DIAGNOSIS — K76.0 FATTY LIVER: ICD-10-CM

## 2024-01-31 PROBLEM — 46177005: Status: ACTIVE | Noted: 2024-01-31

## 2024-01-31 PROBLEM — 79922009: Status: ACTIVE | Noted: 2022-08-31

## 2024-01-31 PROBLEM — 397881000: Status: ACTIVE | Noted: 2021-05-19

## 2024-01-31 PROCEDURE — 99214 OFFICE O/P EST MOD 30 MIN: CPT | Performed by: NURSE PRACTITIONER

## 2024-01-31 RX ORDER — SUCRALFATE 1 G/1
1 TABLET ON AN EMPTY STOMACH TABLET ORAL TWICE A DAY
Qty: 180 TABLET | Refills: 3 | Status: ACTIVE | COMMUNITY

## 2024-01-31 RX ORDER — AMITRIPTYLINE HYDROCHLORIDE 10 MG/1
1 TABLET AT BEDTIME TABLET, FILM COATED ORAL ONCE A DAY
Qty: 90 TABLET | Refills: 3 | Status: ACTIVE | COMMUNITY
Start: 2022-12-07

## 2024-01-31 RX ORDER — LORATADINE/PSEUDOEPHEDRINE 5 MG-120MG
TABLET, EXTENDED RELEASE 12 HR ORAL
Qty: 0 | Refills: 0 | Status: ACTIVE | COMMUNITY
Start: 1900-01-01

## 2024-01-31 RX ORDER — DICYCLOMINE HYDROCHLORIDE 10 MG/1
1 CAPSULES CAPSULE ORAL TWICE DAILY
Qty: 60 CAPSULES | Refills: 11 | Status: ACTIVE | COMMUNITY
Start: 2023-08-03 | End: 2024-07-28

## 2024-01-31 RX ORDER — PANTOPRAZOLE SODIUM 40 MG/1
TAKE 1 TABLET TWICE DAILY BEFORE MEALS TABLET, DELAYED RELEASE ORAL
Qty: 180 TABLET | Refills: 3 | Status: ACTIVE | COMMUNITY

## 2024-01-31 RX ORDER — HYOSCYAMINE SULFATE 0.12 MG/1
1 TABLET UNDER THE TONGUE AND ALLOW TO DISSOLVE  AS NEEDED TABLET SUBLINGUAL
Qty: 60 | Refills: 5 | Status: ACTIVE | COMMUNITY

## 2024-01-31 RX ORDER — ASPIRIN 81 MG/1
TAKE 1 TABLET (81 MG) BY ORAL ROUTE ONCE DAILY TABLET, COATED ORAL 1
Qty: 0 | Refills: 0 | Status: ACTIVE | COMMUNITY
Start: 1900-01-01

## 2024-01-31 RX ORDER — NADOLOL 20 MG/1
TAKE 0.5 TABLET BY ORAL ROUTE DAILY TABLET ORAL
Qty: 15 | Refills: 11 | Status: ON HOLD | COMMUNITY
Start: 2019-11-04

## 2024-01-31 RX ORDER — ESOMEPRAZOLE MAGNESIUM 40 MG/1
1 CAPSULE CAPSULE, DELAYED RELEASE ORAL TWICE DAILY
Qty: 180 CAPSULE | Refills: 3 | OUTPATIENT
Start: 2024-01-31

## 2024-01-31 RX ORDER — AMLODIPINE BESYLATE 5 MG
TABLET ORAL
Qty: 0 | Refills: 0 | Status: ON HOLD | COMMUNITY
Start: 1900-01-01

## 2024-01-31 RX ORDER — ALENDRONATE SODIUM 70 MG
TABLET ORAL
Qty: 0 | Refills: 0 | Status: ACTIVE | COMMUNITY
Start: 1900-01-01

## 2024-01-31 RX ORDER — METFORMIN HYDROCHLORIDE 500 MG/1
TABLET, COATED ORAL
Qty: 0 | Refills: 0 | Status: ACTIVE | COMMUNITY
Start: 1900-01-01

## 2024-01-31 RX ORDER — SPIRONOLACTONE 25 MG/1
TABLET ORAL
Qty: 0 | Refills: 0 | Status: ACTIVE | COMMUNITY
Start: 1900-01-01

## 2024-01-31 RX ORDER — SODIUM POLYSTYRENE SULFONATE 15 G/60ML
60 ML SUSPENSION ORAL; RECTAL TWICE DAILY
Qty: 360 MILLILITERS | Refills: 0 | Status: ACTIVE | COMMUNITY
Start: 2022-06-09

## 2024-01-31 NOTE — HPI-TODAY'S VISIT:
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 Since her last visit she status post EGD for [facility] screening. She has no recurrence on her repeat endoscopy in August. She is stable on nadolol 10 mg twice daily. She denies any abdominal distention or swelling, she has gained weight during the pandemic with the quarantine and agrees that this is due to poor nutritional choices. We continue to discuss low-sodium diet which she tries to maintain for her liver and heart health. Her reflux is stable on high-dose PPI twice daily. She agrees with some weight loss we will likely be able to wean this. She is due for repeat lab work and imaging for HCC screening. Her last ultrasound was in April with no lesions. Her meld this spring was 7. Today she has no symptoms of encephalopathy, jaundice, or pruritus. She is doing well otherwise with no new GI complaints. 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  Since her last visit her labs and ultrasound are stable.  She is down 9 pounds and continues to work on healthy weight loss.  She denies any breakthrough reflux dysphagia or other associated GI symptoms.  She is due for repeat labs and ultrasound this morning.  She is due for repeat Leyla screening this fall.  Today she is doing well otherwise with no new GI complaints and agree to repeat labs and ultrasound.  MB   11/8/2021 Sophie presents for follow-up of end-stage liver disease, fatty liver, reflux with a history of gastroparesis, sequela of liver disease including history of esophageal varices on imaging, a liver lesion noted and stable, and new onset right upper quadrant abdominal pain. Last month she messaged us with postprandial right upper quadrant abdominal pain. She had blood work with normal synthetic function, ultrasound imaging prior to that with no obstructive process. She is due for varix screening. She states this is worse after a meal. She does take PPI twice daily. She states the pain feels similar to her gallbladder but more in the middle. Her blood sugars have been labile, her gastroparesis had improved off narcotics but she does feel like things may be flaring. Today the discomfort seems to have improved somewhat. She is working on weight loss. She does agree to repeat EGD for [facility]'s screening and further evaluation of her discomfort, she does agree to try FD guard as needed. MB 2/4/2022 Sophie presents for endoscopy follow-up.  Her repeat EGD reveals no varices and a small amount of retained food.  Her right upper quadrant pain has improved on FD guard as needed.  Her reflux is stable on twice daily PPI.  She continues to have a low meld, she is due for repeat labs and ultrasound in May 2022, her labs and ultrasound are stable in November 2021, she does have a calcified granuloma in the left lobe.  She is struggling with arthritis and has been on prednisone so her blood sugars and weight have increased.  She continues to work on small meals.  Overall today she is doing well with no new GI complaints.  She is due for repeat colonoscopy in May 2022.  MB 6/6/2022 Sophie presents for follow-up of end-stage liver disease secondary to NAFLD D with sequela of portal hypertension and esophageal varices.  Since her last visit her repeat colonoscopy reveals 1 tubular adenoma left-sided diverticular disease.  She was on 3 capsules of Metamucil daily and felt that this caused increased diarrhea.  She has occasional episodes of incontinence and loose urgent bowel movements.  We have discussed restarting a low-dose at 1 capsule at night given her diverticular disease specifically.  Her reflux is stable on pantoprazole twice daily.  She is on nadolol 20 mg daily.  She is due for repeat labs and HCC screening.  Her very screening will be due in December.  Today she is doing well with no new GI complaints.  She continues to work on tight glucose control and weight control.  She has lost approximately 6 pounds since her last visit.  Today she is doing well otherwise with no new GI complaints.  MB 9/16/2022 Rocco presents for evaluation of worsening epigastric pain associated with dysphagia.  Since her last visit she developed worsening substernal pressure while swallowing.  She states she feels like an air bubble gets trapped.  She also describes a spasm.  This is only associated with eating, this is not exertional.  Her EGD 8/2022 is fairly normal with gastritis, reflux esophagitis, no varices.  Her ultrasound shows no new lesions or stones in her bile duct.  She is status post cholecystectomy.  She is on pantoprazole 40 mg twice daily with no relief.  She does have a history of a hiatal hernia.  Today this is her main complaint.  I am concerned she is having esophageal spasm.  I am going to prescribe Levsin as needed.  We will do a barium study to assess her hernia and look for tertiary contractions.  If this is normal consider esophageal manometry.  Her liver disease is stable, her meld is low.  She has no sequela of her disease.  Her bowels are moving regularly on psyllium daily.  Today her main complaint is epigastric abdominal pain associated with dysphagia.  MB 12/7/2022 Rocco presents for follow up of epigastric abdominal pain, nausea, dysphagia, IBS and cirrhosis. Since her last visit she underwent repeat UGI with presbyesophagus and a small esophageal diverticulum. She is on pantoprole 40mg twice daily. She states her symptoms of pain have improved but she still has episodes once a week when she eats meat that the food seems to rise and fall in her chest and she ultimately is able to pass the bolus. Her partial has broken so she has not been chewing her food well. She has used the levsin when the food seems to stick with no change. She is on fosamax once a week. She is off MTX and on Humira rather than enbrel for her arthritis. She denies any NSAID use other than baby ASA. Her synthetic function has remained stable. She is not on nadolol but metoprolol. Today she is doing somewhat better but still with symptoms. MB  This telehealth visit was provided at the patient's home. 8/3/2023 Sophie presents for follow-up of end-stage liver disease secondary to fatty liver, reflux, and new onset fecal incontinence.  6 weeks ago she developed multiple loose/soft/formed episodes of incontinence, this is happening once or twice a week.  She cannot identify any food triggers.  She is no longer taking Colace.  She is not on psyllium as previously discussed.  Her last colonoscopy reveals left-sided diverticulosis without hemorrhoids done in May 2022.  No random biopsies were taken at that time.  She denies any sick contacts.  She does cheat keep her young grandchildren.  Today she agrees to stool studies.  We will start low-dose psyllium and dicyclomine twice daily.  If her work-up is negative consider a trial of Xifaxan for postinfectious IBS-D.  She is due for repeat labs and ultrasound imaging for surveillance of her liver disease.  Her EGD last year shows no varices and we can repeat at the 2-year interval.  MB 1/31/2024 The patient presents for follow-up of reflux, gastroparesis, IBS, end-stage liver disease secondary to fatty liver, history of esophageal varices, and low meld.  Since her last visit her reflux seems to be flaring.  This is worse at night.  She is on pantoprazole 40 mg in the morning, and a dose at bedtime.  She takes Carafate mid afternoon and sometimes at bedtime as well.  We have discussed we needed to spread these out.  I am to switch her to as omeprazole 40 mg twice daily before breakfast and supper, I want her to take Carafate before lunch and famotidine before bedtime.  She is due for EGD for Park's screening this August.  Her bowels are fairly stable.  She is back on psyllium 1 capsule daily.  She is due for repeat labs along with contrasted imaging this year.  Today she denies any new GI complaints other than her reflux flaring.  MB

## 2024-03-05 ENCOUNTER — OV EP (OUTPATIENT)
Dept: URBAN - NONMETROPOLITAN AREA CLINIC 2 | Facility: CLINIC | Age: 74
End: 2024-03-05

## 2024-03-26 ENCOUNTER — LAB (OUTPATIENT)
Dept: URBAN - NONMETROPOLITAN AREA CLINIC 2 | Facility: CLINIC | Age: 74
End: 2024-03-26

## 2024-03-26 PROBLEM — 123608004: Status: ACTIVE | Noted: 2024-03-26

## 2024-06-03 ENCOUNTER — ERX REFILL RESPONSE (OUTPATIENT)
Dept: URBAN - NONMETROPOLITAN AREA CLINIC 2 | Facility: CLINIC | Age: 74
End: 2024-06-03

## 2024-06-03 RX ORDER — SUCRALFATE 1 G/1
TAKE 1 TABLET TWICE DAILY ON AN EMPTY STOMACH TABLET ORAL
Qty: 180 TABLET | Refills: 3 | OUTPATIENT

## 2024-06-03 RX ORDER — SUCRALFATE 1 G/1
TAKE 1 TABLET TWICE DAILY ON AN EMPTY STOMACH TABLET ORAL
Qty: 180 TABLET | Refills: 4 | OUTPATIENT

## 2024-06-13 ENCOUNTER — ERX REFILL RESPONSE (OUTPATIENT)
Dept: URBAN - NONMETROPOLITAN AREA CLINIC 2 | Facility: CLINIC | Age: 74
End: 2024-06-13

## 2024-06-13 RX ORDER — DICYCLOMINE HYDROCHLORIDE 10 MG/1
1 CAPSULES CAPSULE ORAL TWICE DAILY
Qty: 180 CAPSULES | Refills: 3 | OUTPATIENT

## 2024-06-13 RX ORDER — DICYCLOMINE HYDROCHLORIDE 10 MG/1
1 CAPSULES CAPSULE ORAL TWICE DAILY
Qty: 60 CAPSULES | Refills: 11 | OUTPATIENT

## 2024-07-10 ENCOUNTER — TELEPHONE ENCOUNTER (OUTPATIENT)
Dept: URBAN - NONMETROPOLITAN AREA CLINIC 2 | Facility: CLINIC | Age: 74
End: 2024-07-10

## 2024-07-12 ENCOUNTER — OFFICE VISIT (OUTPATIENT)
Dept: URBAN - METROPOLITAN AREA MEDICAL CENTER 1 | Facility: MEDICAL CENTER | Age: 74
End: 2024-07-12
Payer: MEDICARE

## 2024-07-12 DIAGNOSIS — K83.8 OTHER SPECIFIED DISEASES OF BILIARY TRACT: ICD-10-CM

## 2024-07-12 PROCEDURE — 43259 EGD US EXAM DUODENUM/JEJUNUM: CPT | Performed by: INTERNAL MEDICINE

## 2024-07-23 ENCOUNTER — LAB OUTSIDE AN ENCOUNTER (OUTPATIENT)
Dept: URBAN - NONMETROPOLITAN AREA CLINIC 2 | Facility: CLINIC | Age: 74
End: 2024-07-23

## 2024-07-23 ENCOUNTER — OFFICE VISIT (OUTPATIENT)
Dept: URBAN - NONMETROPOLITAN AREA CLINIC 2 | Facility: CLINIC | Age: 74
End: 2024-07-23
Payer: MEDICARE

## 2024-07-23 ENCOUNTER — TELEPHONE ENCOUNTER (OUTPATIENT)
Dept: URBAN - NONMETROPOLITAN AREA CLINIC 2 | Facility: CLINIC | Age: 74
End: 2024-07-23

## 2024-07-23 VITALS
SYSTOLIC BLOOD PRESSURE: 130 MMHG | DIASTOLIC BLOOD PRESSURE: 84 MMHG | HEIGHT: 68 IN | WEIGHT: 222 LBS | HEART RATE: 60 BPM | BODY MASS INDEX: 33.65 KG/M2

## 2024-07-23 DIAGNOSIS — K74.69 OTHER CIRRHOSIS OF LIVER: ICD-10-CM

## 2024-07-23 DIAGNOSIS — R15.9 FULL INCONTINENCE OF FECES: ICD-10-CM

## 2024-07-23 DIAGNOSIS — K76.9 LIVER LESION: ICD-10-CM

## 2024-07-23 PROBLEM — 1086911000119107: Status: ACTIVE | Noted: 2024-07-23

## 2024-07-23 PROCEDURE — 99214 OFFICE O/P EST MOD 30 MIN: CPT | Performed by: NURSE PRACTITIONER

## 2024-07-23 RX ORDER — METFORMIN HYDROCHLORIDE 500 MG/1
TABLET, COATED ORAL
Qty: 0 | Refills: 0 | Status: ACTIVE | COMMUNITY
Start: 1900-01-01

## 2024-07-23 RX ORDER — FAMOTIDINE 40 MG/1
1 TABLET AT BEDTIME TABLET, FILM COATED ORAL ONCE A DAY
Qty: 90 TABLET | Refills: 3 | Status: ACTIVE | COMMUNITY
Start: 2024-02-14

## 2024-07-23 RX ORDER — NADOLOL 20 MG/1
TAKE 0.5 TABLET BY ORAL ROUTE DAILY TABLET ORAL
Qty: 15 | Refills: 11 | Status: ON HOLD | COMMUNITY
Start: 2019-11-04

## 2024-07-23 RX ORDER — ASPIRIN 81 MG/1
TAKE 1 TABLET (81 MG) BY ORAL ROUTE ONCE DAILY TABLET, COATED ORAL 1
Qty: 0 | Refills: 0 | Status: ACTIVE | COMMUNITY
Start: 1900-01-01

## 2024-07-23 RX ORDER — HYOSCYAMINE SULFATE 0.12 MG/1
1 TABLET UNDER THE TONGUE AND ALLOW TO DISSOLVE  AS NEEDED TABLET SUBLINGUAL
Qty: 60 | Refills: 5 | Status: ACTIVE | COMMUNITY

## 2024-07-23 RX ORDER — AMITRIPTYLINE HYDROCHLORIDE 10 MG/1
1 TABLET AT BEDTIME TABLET, FILM COATED ORAL ONCE A DAY
Qty: 90 TABLET | Refills: 3 | Status: ON HOLD | COMMUNITY

## 2024-07-23 RX ORDER — SUCRALFATE 1 G/1
TAKE 1 TABLET TWICE DAILY ON AN EMPTY STOMACH TABLET ORAL
Qty: 180 TABLET | Refills: 3 | Status: ACTIVE | COMMUNITY

## 2024-07-23 RX ORDER — SPIRONOLACTONE 25 MG/1
TABLET ORAL
Qty: 0 | Refills: 0 | Status: ACTIVE | COMMUNITY
Start: 1900-01-01

## 2024-07-23 RX ORDER — SODIUM POLYSTYRENE SULFONATE 15 G/60ML
60 ML SUSPENSION ORAL; RECTAL TWICE DAILY
Qty: 360 MILLILITERS | Refills: 0 | Status: ACTIVE | COMMUNITY
Start: 2022-06-09

## 2024-07-23 RX ORDER — AMLODIPINE BESYLATE 5 MG
TABLET ORAL
Qty: 0 | Refills: 0 | Status: ON HOLD | COMMUNITY
Start: 1900-01-01

## 2024-07-23 RX ORDER — ALENDRONATE SODIUM 70 MG
TABLET ORAL
Qty: 0 | Refills: 0 | Status: ACTIVE | COMMUNITY
Start: 1900-01-01

## 2024-07-23 RX ORDER — SUCRALFATE 1 G/1
1 TABLET ON AN EMPTY STOMACH TABLET ORAL TWICE A DAY
Qty: 180 TABLET | Refills: 3 | Status: ACTIVE | COMMUNITY

## 2024-07-23 RX ORDER — ESOMEPRAZOLE MAGNESIUM 40 MG/1
1 CAPSULE CAPSULE, DELAYED RELEASE ORAL TWICE DAILY
Qty: 180 CAPSULE | Refills: 3 | Status: ACTIVE | COMMUNITY
Start: 2024-01-31

## 2024-07-23 RX ORDER — LORATADINE/PSEUDOEPHEDRINE 5 MG-120MG
TABLET, EXTENDED RELEASE 12 HR ORAL
Qty: 0 | Refills: 0 | Status: ACTIVE | COMMUNITY
Start: 1900-01-01

## 2024-07-23 RX ORDER — PANTOPRAZOLE SODIUM 40 MG/1
TAKE 1 TABLET TWICE DAILY BEFORE MEALS TABLET, DELAYED RELEASE ORAL
Qty: 180 TABLET | Refills: 3 | Status: ACTIVE | COMMUNITY

## 2024-07-23 RX ORDER — DICYCLOMINE HYDROCHLORIDE 10 MG/1
1 CAPSULES CAPSULE ORAL TWICE DAILY
Qty: 180 CAPSULES | Refills: 3 | Status: ACTIVE | COMMUNITY

## 2024-07-23 RX ORDER — CHOLESTYRAMINE 4 G/9G
1 PACKET MIXED WITH WATER OR NON-CARBONATED DRINK POWDER, FOR SUSPENSION ORAL ONCE A DAY
Qty: 90 | Refills: 3 | OUTPATIENT
Start: 2024-07-23

## 2024-07-23 NOTE — HPI-TODAY'S VISIT:
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 Since her last visit she status post EGD for Park's screening. She has no recurrence on her repeat endoscopy in August. She is stable on nadolol 10 mg twice daily. She denies any abdominal distention or swelling, she has gained weight during the pandemic with the quarantine and agrees that this is due to poor nutritional choices. We continue to discuss low-sodium diet which she tries to maintain for her liver and heart health. Her reflux is stable on high-dose PPI twice daily. She agrees with some weight loss we will likely be able to wean this. She is due for repeat lab work and imaging for HCC screening. Her last ultrasound was in April with no lesions. Her meld this spring was 7. Today she has no symptoms of encephalopathy, jaundice, or pruritus. She is doing well otherwise with no new GI complaints. 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  Since her last visit her labs and ultrasound are stable.  She is down 9 pounds and continues to work on healthy weight loss.  She denies any breakthrough reflux dysphagia or other associated GI symptoms.  She is due for repeat labs and ultrasound this morning.  She is due for repeat Leyla screening this fall.  Today she is doing well otherwise with no new GI complaints and agree to repeat labs and ultrasound.  MB   11/8/2021 Sophie presents for follow-up of end-stage liver disease, fatty liver, reflux with a history of gastroparesis, sequela of liver disease including history of esophageal varices on imaging, a liver lesion noted and stable, and new onset right upper quadrant abdominal pain. Last month she messaged us with postprandial right upper quadrant abdominal pain. She had blood work with normal synthetic function, ultrasound imaging prior to that with no obstructive process. She is due for varix screening. She states this is worse after a meal. She does take PPI twice daily. She states the pain feels similar to her gallbladder but more in the middle. Her blood sugars have been labile, her gastroparesis had improved off narcotics but she does feel like things may be flaring. Today the discomfort seems to have improved somewhat. She is working on weight loss. She does agree to repeat EGD for Park's screening and further evaluation of her discomfort, she does agree to try FD guard as needed. MB 2/4/2022 Sophie presents for endoscopy follow-up.  Her repeat EGD reveals no varices and a small amount of retained food.  Her right upper quadrant pain has improved on FD guard as needed.  Her reflux is stable on twice daily PPI.  She continues to have a low meld, she is due for repeat labs and ultrasound in May 2022, her labs and ultrasound are stable in November 2021, she does have a calcified granuloma in the left lobe.  She is struggling with arthritis and has been on prednisone so her blood sugars and weight have increased.  She continues to work on small meals.  Overall today she is doing well with no new GI complaints.  She is due for repeat colonoscopy in May 2022.  MB 6/6/2022 Sophie presents for follow-up of end-stage liver disease secondary to NAFLD D with sequela of portal hypertension and esophageal varices.  Since her last visit her repeat colonoscopy reveals 1 tubular adenoma left-sided diverticular disease.  She was on 3 capsules of Metamucil daily and felt that this caused increased diarrhea.  She has occasional episodes of incontinence and loose urgent bowel movements.  We have discussed restarting a low-dose at 1 capsule at night given her diverticular disease specifically.  Her reflux is stable on pantoprazole twice daily.  She is on nadolol 20 mg daily.  She is due for repeat labs and HCC screening.  Her very screening will be due in December.  Today she is doing well with no new GI complaints.  She continues to work on tight glucose control and weight control.  She has lost approximately 6 pounds since her last visit.  Today she is doing well otherwise with no new GI complaints.  MB 9/16/2022 Rocco presents for evaluation of worsening epigastric pain associated with dysphagia.  Since her last visit she developed worsening substernal pressure while swallowing.  She states she feels like an air bubble gets trapped.  She also describes a spasm.  This is only associated with eating, this is not exertional.  Her EGD 8/2022 is fairly normal with gastritis, reflux esophagitis, no varices.  Her ultrasound shows no new lesions or stones in her bile duct.  She is status post cholecystectomy.  She is on pantoprazole 40 mg twice daily with no relief.  She does have a history of a hiatal hernia.  Today this is her main complaint.  I am concerned she is having esophageal spasm.  I am going to prescribe Levsin as needed.  We will do a barium study to assess her hernia and look for tertiary contractions.  If this is normal consider esophageal manometry.  Her liver disease is stable, her meld is low.  She has no sequela of her disease.  Her bowels are moving regularly on psyllium daily.  Today her main complaint is epigastric abdominal pain associated with dysphagia.  MB 12/7/2022 Rocco presents for follow up of epigastric abdominal pain, nausea, dysphagia, IBS and cirrhosis. Since her last visit she underwent repeat UGI with presbyesophagus and a small esophageal diverticulum. She is on pantoprole 40mg twice daily. She states her symptoms of pain have improved but she still has episodes once a week when she eats meat that the food seems to rise and fall in her chest and she ultimately is able to pass the bolus. Her partial has broken so she has not been chewing her food well. She has used the levsin when the food seems to stick with no change. She is on fosamax once a week. She is off MTX and on Humira rather than enbrel for her arthritis. She denies any NSAID use other than baby ASA. Her synthetic function has remained stable. She is not on nadolol but metoprolol. Today she is doing somewhat better but still with symptoms. MB  This telehealth visit was provided at the patient's home. 8/3/2023 Sophie presents for follow-up of end-stage liver disease secondary to fatty liver, reflux, and new onset fecal incontinence.  6 weeks ago she developed multiple loose/soft/formed episodes of incontinence, this is happening once or twice a week.  She cannot identify any food triggers.  She is no longer taking Colace.  She is not on psyllium as previously discussed.  Her last colonoscopy reveals left-sided diverticulosis without hemorrhoids done in May 2022.  No random biopsies were taken at that time.  She denies any sick contacts.  She does cheat keep her young grandchildren.  Today she agrees to stool studies.  We will start low-dose psyllium and dicyclomine twice daily.  If her work-up is negative consider a trial of Xifaxan for postinfectious IBS-D.  She is due for repeat labs and ultrasound imaging for surveillance of her liver disease.  Her EGD last year shows no varices and we can repeat at the 2-year interval.  MB 1/31/2024 The patient presents for follow-up of reflux, gastroparesis, IBS, end-stage liver disease secondary to fatty liver, history of esophageal varices, and low meld.  Since her last visit her reflux seems to be flaring.  This is worse at night.  She is on pantoprazole 40 mg in the morning, and a dose at bedtime.  She takes Carafate mid afternoon and sometimes at bedtime as well.  We have discussed we needed to spread these out.  I am to switch her to as omeprazole 40 mg twice daily before breakfast and supper, I want her to take Carafate before lunch and famotidine before bedtime.  She is due for EGD for Park's screening this August.  Her bowels are fairly stable.  She is back on psyllium 1 capsule daily.  She is due for repeat labs along with contrasted imaging this year.  Today she denies any new GI complaints other than her reflux flaring.  MB 7/23/2024 The patient presents for follow-up of end-stage liver disease, history of reflux with esophageal diverticulum and esophageal spasm, and irritable bowel syndrome along with fecal incontinence.  Since her last visit she continues to struggle with incontinence.  At times the stool is soft and still formed when she has incontinence.  She is on low-dose psyllium with no relief.  She has tried Imodium, anticholinergics including dicyclomine and amitriptyline, Xifaxan, with no relief.  Her last colonoscopy shows no significant pathology.  Today she agrees to a trial of cholestyramine and we will schedule anorectal manometry.  She may benefit from pelvic floor therapy plus or minus biofeedback.  Her synthetic liver function remains normal.  Her imaging in February shows dilated CBD with questionable ampullary lesion.  Her EUS done in June by Dr. Gomes shows no varices and no ampullary lesion with stable CBD likely status post cholecystectomy.  She has no significant sequela of her liver disease, today she is actually doing fairly well in regard to her GI complaints except for her fecal incontinence.  MB

## 2024-07-24 LAB
A/G RATIO: 1.3
ABSOLUTE BASOPHILS: 29
ABSOLUTE EOSINOPHILS: 109
ABSOLUTE LYMPHOCYTES: 1949
ABSOLUTE MONOCYTES: 319
ABSOLUTE NEUTROPHILS: 1793
AFP, SERUM, TUMOR MARKER: 4.4
ALBUMIN: 3.9
ALKALINE PHOSPHATASE: 86
ALT (SGPT): 28
AST (SGOT): 33
BASOPHILS: 0.7
BILIRUBIN, TOTAL: 0.4
BUN/CREATININE RATIO: 21
BUN: 26
CALCIUM: 9.4
CARBON DIOXIDE, TOTAL: 24
CHLORIDE: 109
CREATININE: 1.26
EGFR: 45
EOSINOPHILS: 2.6
GLOBULIN, TOTAL: 2.9
GLUCOSE: 122
HEMATOCRIT: 36.8
HEMOGLOBIN: 11.3
INR: 1
LYMPHOCYTES: 46.4
MCH: 24.6
MCHC: 30.7
MCV: 80
MONOCYTES: 7.6
MPV: 11.4
NEUTROPHILS: 42.7
PLATELET COUNT: 196
POTASSIUM: 4.6
PROTEIN, TOTAL: 6.8
PT: 10.9
RDW: 15
RED BLOOD CELL COUNT: 4.6
SODIUM: 142
WHITE BLOOD CELL COUNT: 4.2

## 2024-08-01 ENCOUNTER — LAB OUTSIDE AN ENCOUNTER (OUTPATIENT)
Dept: URBAN - METROPOLITAN AREA TELEHEALTH 2 | Facility: TELEHEALTH | Age: 74
End: 2024-08-01

## 2024-08-14 ENCOUNTER — WEB ENCOUNTER (OUTPATIENT)
Dept: URBAN - NONMETROPOLITAN AREA CLINIC 2 | Facility: CLINIC | Age: 74
End: 2024-08-14

## 2024-08-19 ENCOUNTER — OFFICE VISIT (OUTPATIENT)
Dept: URBAN - METROPOLITAN AREA MEDICAL CENTER 28 | Facility: MEDICAL CENTER | Age: 74
End: 2024-08-19

## 2024-10-25 ENCOUNTER — OFFICE VISIT (OUTPATIENT)
Dept: URBAN - NONMETROPOLITAN AREA CLINIC 2 | Facility: CLINIC | Age: 74
End: 2024-10-25

## 2024-11-13 ENCOUNTER — WEB ENCOUNTER (OUTPATIENT)
Dept: URBAN - NONMETROPOLITAN AREA CLINIC 2 | Facility: CLINIC | Age: 74
End: 2024-11-13

## 2024-11-13 RX ORDER — CHOLESTYRAMINE 4 G/9G
1 PACKET MIXED WITH WATER OR NON-CARBONATED DRINK POWDER, FOR SUSPENSION ORAL ONCE A DAY
Qty: 90 | Refills: 3
Start: 2024-07-23

## 2025-01-10 ENCOUNTER — WEB ENCOUNTER (OUTPATIENT)
Dept: URBAN - NONMETROPOLITAN AREA CLINIC 2 | Facility: CLINIC | Age: 75
End: 2025-01-10

## 2025-01-13 ENCOUNTER — WEB ENCOUNTER (OUTPATIENT)
Dept: URBAN - NONMETROPOLITAN AREA CLINIC 2 | Facility: CLINIC | Age: 75
End: 2025-01-13

## 2025-04-02 ENCOUNTER — ERX REFILL RESPONSE (OUTPATIENT)
Dept: URBAN - NONMETROPOLITAN AREA CLINIC 2 | Facility: CLINIC | Age: 75
End: 2025-04-02

## 2025-04-02 RX ORDER — DICYCLOMINE HYDROCHLORIDE 10 MG/1
TAKE 1 CAPSULE TWICE DAILY CAPSULE ORAL
Qty: 180 CAPSULE | Refills: 3 | OUTPATIENT

## 2025-04-22 ENCOUNTER — ERX REFILL RESPONSE (OUTPATIENT)
Dept: URBAN - NONMETROPOLITAN AREA CLINIC 2 | Facility: CLINIC | Age: 75
End: 2025-04-22

## 2025-04-22 RX ORDER — FAMOTIDINE 40 MG/1
1 TABLET AT BEDTIME TABLET, FILM COATED ORAL ONCE A DAY
Qty: 90 TABLET | Refills: 3